# Patient Record
Sex: MALE | Race: WHITE | Employment: STUDENT | URBAN - METROPOLITAN AREA
[De-identification: names, ages, dates, MRNs, and addresses within clinical notes are randomized per-mention and may not be internally consistent; named-entity substitution may affect disease eponyms.]

---
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
## 2023-01-03 ENCOUNTER — LAB ENCOUNTER (OUTPATIENT)
Dept: LAB | Age: 18
End: 2023-01-03
Attending: FAMILY MEDICINE
Payer: COMMERCIAL

## 2023-01-03 DIAGNOSIS — N50.89 EPIDIDYMAL MASS: ICD-10-CM

## 2023-01-03 DIAGNOSIS — R10.31 GROIN PAIN, RIGHT: ICD-10-CM

## 2023-01-03 LAB
AFP-TM SERPL-MCNC: 1.3 NG/ML (ref ?–8)
BASOPHILS # BLD AUTO: 0.09 X10(3) UL (ref 0–0.2)
BASOPHILS NFR BLD AUTO: 1.2 %
BILIRUB UR QL STRIP.AUTO: NEGATIVE
CLARITY UR REFRACT.AUTO: CLEAR
COLOR UR AUTO: YELLOW
EOSINOPHIL # BLD AUTO: 0.26 X10(3) UL (ref 0–0.7)
EOSINOPHIL NFR BLD AUTO: 3.4 %
ERYTHROCYTE [DISTWIDTH] IN BLOOD BY AUTOMATED COUNT: 12.5 %
GLUCOSE UR STRIP.AUTO-MCNC: NEGATIVE MG/DL
HCT VFR BLD AUTO: 46.2 %
HGB BLD-MCNC: 15.4 G/DL
IMM GRANULOCYTES # BLD AUTO: 0.09 X10(3) UL (ref 0–1)
IMM GRANULOCYTES NFR BLD: 1.2 %
KETONES UR STRIP.AUTO-MCNC: NEGATIVE MG/DL
LEUKOCYTE ESTERASE UR QL STRIP.AUTO: NEGATIVE
LYMPHOCYTES # BLD AUTO: 2 X10(3) UL (ref 1.5–5)
LYMPHOCYTES NFR BLD AUTO: 26.3 %
MCH RBC QN AUTO: 31.5 PG (ref 26–34)
MCHC RBC AUTO-ENTMCNC: 33.3 G/DL (ref 31–37)
MCV RBC AUTO: 94.5 FL
MONOCYTES # BLD AUTO: 1.07 X10(3) UL (ref 0.1–1)
MONOCYTES NFR BLD AUTO: 14.1 %
NEUTROPHILS # BLD AUTO: 4.09 X10 (3) UL (ref 1.5–7.7)
NEUTROPHILS # BLD AUTO: 4.09 X10(3) UL (ref 1.5–7.7)
NEUTROPHILS NFR BLD AUTO: 53.8 %
NITRITE UR QL STRIP.AUTO: NEGATIVE
PH UR STRIP.AUTO: 7 [PH] (ref 5–8)
PLATELET # BLD AUTO: 264 10(3)UL (ref 150–450)
PROT UR STRIP.AUTO-MCNC: NEGATIVE MG/DL
RBC # BLD AUTO: 4.89 X10(6)UL
RBC UR QL AUTO: NEGATIVE
SP GR UR STRIP.AUTO: 1.02 (ref 1–1.03)
UROBILINOGEN UR STRIP.AUTO-MCNC: 0.2 MG/DL
WBC # BLD AUTO: 7.6 X10(3) UL (ref 4–11)

## 2023-01-03 PROCEDURE — 84402 ASSAY OF FREE TESTOSTERONE: CPT | Performed by: FAMILY MEDICINE

## 2023-01-03 PROCEDURE — 84403 ASSAY OF TOTAL TESTOSTERONE: CPT | Performed by: FAMILY MEDICINE

## 2023-01-03 PROCEDURE — 81003 URINALYSIS AUTO W/O SCOPE: CPT | Performed by: FAMILY MEDICINE

## 2023-01-03 PROCEDURE — 82105 ALPHA-FETOPROTEIN SERUM: CPT | Performed by: FAMILY MEDICINE

## 2023-01-03 PROCEDURE — 85025 COMPLETE CBC W/AUTO DIFF WBC: CPT | Performed by: FAMILY MEDICINE

## 2023-01-07 LAB
TESTOSTERONE TOTAL: 588.6 NG/DL
TESTOSTERONE, FREE -MS/MS: 85.7 PG/ML

## 2023-01-12 ENCOUNTER — TELEPHONE (OUTPATIENT)
Dept: FAMILY MEDICINE CLINIC | Facility: CLINIC | Age: 18
End: 2023-01-12

## 2023-01-12 DIAGNOSIS — N50.89 EPIDIDYMAL MASS: Primary | ICD-10-CM

## 2023-01-12 NOTE — TELEPHONE ENCOUNTER
FYI: Received a call from [facility] talked with Jessica Enriquez who states pt needs order for US scrotum to look at epididymal mass. Order placed. Jessica Enriquez verbalized understanding.    LOV: 12/28/22

## 2023-08-29 ENCOUNTER — HOSPITAL ENCOUNTER (EMERGENCY)
Age: 18
Discharge: HOME | End: 2023-08-29
Attending: EMERGENCY MEDICINE
Payer: COMMERCIAL

## 2023-08-29 ENCOUNTER — APPOINTMENT (OUTPATIENT)
Dept: GENERAL RADIOLOGY | Age: 18
End: 2023-08-29
Payer: COMMERCIAL

## 2023-08-29 VITALS
WEIGHT: 185 LBS | TEMPERATURE: 99 F | HEIGHT: 70 IN | OXYGEN SATURATION: 99 % | HEART RATE: 76 BPM | DIASTOLIC BLOOD PRESSURE: 77 MMHG | BODY MASS INDEX: 26.48 KG/M2 | SYSTOLIC BLOOD PRESSURE: 118 MMHG | RESPIRATION RATE: 16 BRPM

## 2023-08-29 DIAGNOSIS — S93.402A MODERATE LEFT ANKLE SPRAIN, INITIAL ENCOUNTER: Primary | ICD-10-CM

## 2023-08-29 PROCEDURE — 73610 X-RAY EXAM OF ANKLE: CPT

## 2023-08-29 PROCEDURE — 99284 EMERGENCY DEPT VISIT MOD MDM: CPT

## 2023-08-29 RX ORDER — IBUPROFEN 600 MG/1
600 TABLET ORAL ONCE
Status: COMPLETED | OUTPATIENT
Start: 2023-08-29 | End: 2023-08-29

## 2023-08-29 NOTE — ED INITIAL ASSESSMENT (HPI)
States while skateboarding he rolled his left ankle and is now unable to bear weight. States pain is worse on elevation.  No parasthesias ice in place

## 2023-08-29 NOTE — DISCHARGE INSTRUCTIONS
Ice, elevate, ibuprofen. Strip splint as needed. Crutches as needed. Weightbearing as tolerated. Return if new or worsening symptoms. Follow-up for further evaluation as discussed.

## 2023-11-11 ENCOUNTER — HOSPITAL ENCOUNTER (OUTPATIENT)
Age: 18
Discharge: HOME | End: 2023-11-11
Payer: COMMERCIAL

## 2023-11-11 ENCOUNTER — APPOINTMENT (OUTPATIENT)
Dept: ULTRASOUND IMAGING | Age: 18
End: 2023-11-11
Attending: NURSE PRACTITIONER
Payer: COMMERCIAL

## 2023-11-11 VITALS
RESPIRATION RATE: 20 BRPM | HEIGHT: 70 IN | BODY MASS INDEX: 27.2 KG/M2 | OXYGEN SATURATION: 98 % | TEMPERATURE: 98 F | DIASTOLIC BLOOD PRESSURE: 62 MMHG | WEIGHT: 190 LBS | SYSTOLIC BLOOD PRESSURE: 139 MMHG | HEART RATE: 86 BPM

## 2023-11-11 DIAGNOSIS — K92.0 HEMATEMESIS WITHOUT NAUSEA: ICD-10-CM

## 2023-11-11 DIAGNOSIS — R10.9 ABDOMINAL PAIN, ACUTE: Primary | ICD-10-CM

## 2023-11-11 LAB
#MXD IC: 0.8 X10ˆ3/UL (ref 0.1–1)
BUN BLD-MCNC: 15 MG/DL (ref 7–18)
CHLORIDE BLD-SCNC: 103 MMOL/L (ref 98–112)
CO2 BLD-SCNC: 25 MMOL/L (ref 21–32)
CREAT BLD-MCNC: 1.1 MG/DL
EGFRCR SERPLBLD CKD-EPI 2021: 99 ML/MIN/1.73M2 (ref 60–?)
GLUCOSE BLD-MCNC: 103 MG/DL (ref 70–99)
HCT VFR BLD AUTO: 46.5 %
HCT VFR BLD CALC: 47 %
HGB BLD-MCNC: 14.9 G/DL
ISTAT IONIZED CALCIUM FOR CHEM 8: 1.23 MMOL/L (ref 1.12–1.32)
LYMPHOCYTES # BLD AUTO: 2.2 X10ˆ3/UL (ref 1–4)
LYMPHOCYTES NFR BLD AUTO: 20.7 %
MCH RBC QN AUTO: 29.4 PG (ref 26–34)
MCHC RBC AUTO-ENTMCNC: 32 G/DL (ref 31–37)
MCV RBC AUTO: 91.9 FL (ref 80–100)
MIXED CELL %: 7.6 %
NEUTROPHILS # BLD AUTO: 7.6 X10ˆ3/UL (ref 1.5–7.7)
NEUTROPHILS NFR BLD AUTO: 71.7 %
PLATELET # BLD AUTO: 305 X10ˆ3/UL (ref 150–450)
POTASSIUM BLD-SCNC: 4.2 MMOL/L (ref 3.6–5.1)
RBC # BLD AUTO: 5.06 X10ˆ6/UL
SODIUM BLD-SCNC: 141 MMOL/L (ref 136–145)
WBC # BLD AUTO: 10.6 X10ˆ3/UL (ref 4–11)

## 2023-11-11 PROCEDURE — 99214 OFFICE O/P EST MOD 30 MIN: CPT

## 2023-11-11 PROCEDURE — 76700 US EXAM ABDOM COMPLETE: CPT | Performed by: NURSE PRACTITIONER

## 2023-11-11 PROCEDURE — 80047 BASIC METABLC PNL IONIZED CA: CPT

## 2023-11-11 PROCEDURE — 36415 COLL VENOUS BLD VENIPUNCTURE: CPT

## 2023-11-11 PROCEDURE — 85025 COMPLETE CBC W/AUTO DIFF WBC: CPT | Performed by: NURSE PRACTITIONER

## 2023-11-11 RX ORDER — PANTOPRAZOLE SODIUM 20 MG/1
20 TABLET, DELAYED RELEASE ORAL DAILY
Qty: 30 TABLET | Refills: 0 | Status: SHIPPED | OUTPATIENT
Start: 2023-11-11 | End: 2023-11-29

## 2023-11-11 RX ORDER — METHYLPHENIDATE HYDROCHLORIDE 20 MG/1
CAPSULE, EXTENDED RELEASE ORAL
COMMUNITY

## 2023-11-11 NOTE — DISCHARGE INSTRUCTIONS
Follow-up with GI, call Monday for an appointment.   Return for any dizziness, increased blood in vomit, diarrhea or any other concerns

## 2023-11-11 NOTE — ED INITIAL ASSESSMENT (HPI)
Abdominal pain x1-2 weeks. Reports vomiting \"chunks of blood\" three days ago and then today. Vomiting x1-2 years.

## 2023-11-29 ENCOUNTER — OFFICE VISIT (OUTPATIENT)
Dept: FAMILY MEDICINE CLINIC | Facility: CLINIC | Age: 18
End: 2023-11-29
Payer: COMMERCIAL

## 2023-11-29 ENCOUNTER — LAB ENCOUNTER (OUTPATIENT)
Dept: LAB | Age: 18
End: 2023-11-29
Attending: FAMILY MEDICINE
Payer: COMMERCIAL

## 2023-11-29 VITALS
TEMPERATURE: 98 F | WEIGHT: 214 LBS | SYSTOLIC BLOOD PRESSURE: 120 MMHG | HEIGHT: 70 IN | BODY MASS INDEX: 30.64 KG/M2 | OXYGEN SATURATION: 98 % | HEART RATE: 80 BPM | RESPIRATION RATE: 16 BRPM | DIASTOLIC BLOOD PRESSURE: 70 MMHG

## 2023-11-29 DIAGNOSIS — K92.0 HEMATEMESIS WITH NAUSEA: ICD-10-CM

## 2023-11-29 DIAGNOSIS — R63.5 WEIGHT GAIN, ABNORMAL: ICD-10-CM

## 2023-11-29 DIAGNOSIS — K92.0 HEMATEMESIS WITH NAUSEA: Primary | ICD-10-CM

## 2023-11-29 LAB
ALBUMIN SERPL-MCNC: 4.4 G/DL (ref 3.4–5)
ALP LIVER SERPL-CCNC: 77 U/L
ALT SERPL-CCNC: 30 U/L
AST SERPL-CCNC: 10 U/L (ref 15–37)
BILIRUB DIRECT SERPL-MCNC: 0.1 MG/DL (ref 0–0.2)
BILIRUB SERPL-MCNC: 0.4 MG/DL (ref 0.1–2)
ERYTHROCYTE [DISTWIDTH] IN BLOOD BY AUTOMATED COUNT: 12.7 %
HCT VFR BLD AUTO: 45 %
HGB BLD-MCNC: 14.9 G/DL
MCH RBC QN AUTO: 30.3 PG (ref 26–34)
MCHC RBC AUTO-ENTMCNC: 33.1 G/DL (ref 31–37)
MCV RBC AUTO: 91.5 FL
PLATELET # BLD AUTO: 307 10(3)UL (ref 150–450)
PROT SERPL-MCNC: 8 G/DL (ref 6.4–8.2)
RBC # BLD AUTO: 4.92 X10(6)UL
TSI SER-ACNC: 1.74 MIU/ML (ref 0.36–3.74)
WBC # BLD AUTO: 10 X10(3) UL (ref 4–11)

## 2023-11-29 PROCEDURE — 99214 OFFICE O/P EST MOD 30 MIN: CPT | Performed by: FAMILY MEDICINE

## 2023-11-29 PROCEDURE — 3008F BODY MASS INDEX DOCD: CPT | Performed by: FAMILY MEDICINE

## 2023-11-29 PROCEDURE — 80076 HEPATIC FUNCTION PANEL: CPT | Performed by: FAMILY MEDICINE

## 2023-11-29 PROCEDURE — 3078F DIAST BP <80 MM HG: CPT | Performed by: FAMILY MEDICINE

## 2023-11-29 PROCEDURE — 84443 ASSAY THYROID STIM HORMONE: CPT | Performed by: FAMILY MEDICINE

## 2023-11-29 PROCEDURE — 3074F SYST BP LT 130 MM HG: CPT | Performed by: FAMILY MEDICINE

## 2023-11-29 PROCEDURE — 83036 HEMOGLOBIN GLYCOSYLATED A1C: CPT | Performed by: FAMILY MEDICINE

## 2023-11-29 PROCEDURE — 85027 COMPLETE CBC AUTOMATED: CPT | Performed by: FAMILY MEDICINE

## 2023-11-29 RX ORDER — ONDANSETRON 4 MG/1
4 TABLET, FILM COATED ORAL EVERY 8 HOURS PRN
Qty: 30 TABLET | Refills: 0 | Status: SHIPPED | OUTPATIENT
Start: 2023-11-29

## 2023-11-29 RX ORDER — PANTOPRAZOLE SODIUM 40 MG/1
TABLET, DELAYED RELEASE ORAL
Qty: 120 TABLET | Refills: 0 | Status: SHIPPED | OUTPATIENT
Start: 2023-11-29 | End: 2024-02-27

## 2023-11-30 LAB
EST. AVERAGE GLUCOSE BLD GHB EST-MCNC: 105 MG/DL (ref 68–126)
HBA1C MFR BLD: 5.3 % (ref ?–5.7)

## 2023-12-19 ENCOUNTER — OFFICE VISIT (OUTPATIENT)
Dept: FAMILY MEDICINE CLINIC | Facility: CLINIC | Age: 18
End: 2023-12-19
Payer: COMMERCIAL

## 2023-12-19 VITALS
WEIGHT: 207 LBS | HEIGHT: 70 IN | SYSTOLIC BLOOD PRESSURE: 106 MMHG | HEART RATE: 95 BPM | DIASTOLIC BLOOD PRESSURE: 60 MMHG | RESPIRATION RATE: 18 BRPM | BODY MASS INDEX: 29.63 KG/M2 | OXYGEN SATURATION: 98 % | TEMPERATURE: 98 F

## 2023-12-19 DIAGNOSIS — L21.9 ACUTE SEBORRHEIC DERMATITIS: Primary | ICD-10-CM

## 2023-12-19 PROCEDURE — 99213 OFFICE O/P EST LOW 20 MIN: CPT | Performed by: FAMILY MEDICINE

## 2023-12-19 PROCEDURE — 3074F SYST BP LT 130 MM HG: CPT | Performed by: FAMILY MEDICINE

## 2023-12-19 PROCEDURE — 3078F DIAST BP <80 MM HG: CPT | Performed by: FAMILY MEDICINE

## 2023-12-19 PROCEDURE — 3008F BODY MASS INDEX DOCD: CPT | Performed by: FAMILY MEDICINE

## 2023-12-19 RX ORDER — KETOCONAZOLE 20 MG/ML
10 SHAMPOO TOPICAL
Qty: 100 ML | Refills: 0 | Status: SHIPPED | OUTPATIENT
Start: 2023-12-21 | End: 2024-01-25

## 2023-12-19 RX ORDER — DEXTROAMPHETAMINE SACCHARATE, AMPHETAMINE ASPARTATE MONOHYDRATE, DEXTROAMPHETAMINE SULFATE AND AMPHETAMINE SULFATE 5; 5; 5; 5 MG/1; MG/1; MG/1; MG/1
CAPSULE, EXTENDED RELEASE ORAL
COMMUNITY
Start: 2023-12-14

## 2023-12-19 RX ORDER — KETOCONAZOLE 2 G/100G
1 AEROSOL, FOAM TOPICAL 2 TIMES DAILY
Qty: 100 G | Refills: 1 | Status: SHIPPED | OUTPATIENT
Start: 2023-12-19 | End: 2023-12-19

## 2023-12-19 NOTE — PATIENT INSTRUCTIONS
Use one of the following shampoos to help keep scalp clean and free of dandruff and fungus that causes dandruff     - Selsun Blue (selenium sulfide)  - Head & Shoulders (zinc pyrithione)  - Neutragena T-Gel  (coal tar)

## 2024-02-04 ENCOUNTER — HOSPITAL ENCOUNTER (EMERGENCY)
Age: 19
Discharge: HOME | End: 2024-02-04
Attending: EMERGENCY MEDICINE
Payer: COMMERCIAL

## 2024-02-04 ENCOUNTER — APPOINTMENT (OUTPATIENT)
Dept: GENERAL RADIOLOGY | Age: 19
End: 2024-02-04
Attending: NURSE PRACTITIONER
Payer: COMMERCIAL

## 2024-02-04 VITALS
WEIGHT: 180 LBS | TEMPERATURE: 98 F | HEART RATE: 90 BPM | OXYGEN SATURATION: 99 % | RESPIRATION RATE: 20 BRPM | HEIGHT: 70 IN | BODY MASS INDEX: 25.77 KG/M2 | SYSTOLIC BLOOD PRESSURE: 133 MMHG | DIASTOLIC BLOOD PRESSURE: 81 MMHG

## 2024-02-04 DIAGNOSIS — R10.11 RUQ PAIN: ICD-10-CM

## 2024-02-04 DIAGNOSIS — R07.9 CHEST PAIN OF UNCERTAIN ETIOLOGY: Primary | ICD-10-CM

## 2024-02-04 LAB
ALBUMIN SERPL-MCNC: 4.1 G/DL (ref 3.4–5)
ALBUMIN/GLOB SERPL: 1.2 {RATIO} (ref 1–2)
ALP LIVER SERPL-CCNC: 65 U/L
ALT SERPL-CCNC: 36 U/L
ANION GAP SERPL CALC-SCNC: 2 MMOL/L (ref 0–18)
AST SERPL-CCNC: 14 U/L (ref 15–37)
BASOPHILS # BLD AUTO: 0.08 X10(3) UL (ref 0–0.2)
BASOPHILS NFR BLD AUTO: 0.5 %
BILIRUB SERPL-MCNC: 0.3 MG/DL (ref 0.1–2)
BUN BLD-MCNC: 16 MG/DL (ref 9–23)
CALCIUM BLD-MCNC: 9.6 MG/DL (ref 8.5–10.1)
CHLORIDE SERPL-SCNC: 110 MMOL/L (ref 98–112)
CO2 SERPL-SCNC: 30 MMOL/L (ref 21–32)
CREAT BLD-MCNC: 1.28 MG/DL
D DIMER PPP FEU-MCNC: <0.27 UG/ML FEU (ref ?–0.5)
EGFRCR SERPLBLD CKD-EPI 2021: 82 ML/MIN/1.73M2 (ref 60–?)
EOSINOPHIL # BLD AUTO: 0.07 X10(3) UL (ref 0–0.7)
EOSINOPHIL NFR BLD AUTO: 0.4 %
ERYTHROCYTE [DISTWIDTH] IN BLOOD BY AUTOMATED COUNT: 12.7 %
GLOBULIN PLAS-MCNC: 3.4 G/DL (ref 2.8–4.4)
GLUCOSE BLD-MCNC: 66 MG/DL (ref 70–99)
GLUCOSE BLD-MCNC: 93 MG/DL (ref 70–99)
HCT VFR BLD AUTO: 42.4 %
HGB BLD-MCNC: 14.4 G/DL
IMM GRANULOCYTES # BLD AUTO: 0.42 X10(3) UL (ref 0–1)
IMM GRANULOCYTES NFR BLD: 2.6 %
LIPASE SERPL-CCNC: 34 U/L (ref 13–75)
LYMPHOCYTES # BLD AUTO: 2.68 X10(3) UL (ref 1–4)
LYMPHOCYTES NFR BLD AUTO: 16.7 %
MCH RBC QN AUTO: 30.2 PG (ref 26–34)
MCHC RBC AUTO-ENTMCNC: 34 G/DL (ref 31–37)
MCV RBC AUTO: 88.9 FL
MONOCYTES # BLD AUTO: 0.95 X10(3) UL (ref 0.1–1)
MONOCYTES NFR BLD AUTO: 5.9 %
NEUTROPHILS # BLD AUTO: 11.81 X10 (3) UL (ref 1.5–7.7)
NEUTROPHILS # BLD AUTO: 11.81 X10(3) UL (ref 1.5–7.7)
NEUTROPHILS NFR BLD AUTO: 73.9 %
OSMOLALITY SERPL CALC.SUM OF ELEC: 293 MOSM/KG (ref 275–295)
PLATELET # BLD AUTO: 380 10(3)UL (ref 150–450)
POTASSIUM SERPL-SCNC: 4.1 MMOL/L (ref 3.5–5.1)
PROT SERPL-MCNC: 7.5 G/DL (ref 6.4–8.2)
RBC # BLD AUTO: 4.77 X10(6)UL
SODIUM SERPL-SCNC: 142 MMOL/L (ref 136–145)
TROPONIN I SERPL HS-MCNC: 3 NG/L
WBC # BLD AUTO: 16 X10(3) UL (ref 4–11)

## 2024-02-04 PROCEDURE — 36415 COLL VENOUS BLD VENIPUNCTURE: CPT

## 2024-02-04 PROCEDURE — 93005 ELECTROCARDIOGRAM TRACING: CPT

## 2024-02-04 PROCEDURE — 84484 ASSAY OF TROPONIN QUANT: CPT | Performed by: NURSE PRACTITIONER

## 2024-02-04 PROCEDURE — 99285 EMERGENCY DEPT VISIT HI MDM: CPT

## 2024-02-04 PROCEDURE — 93010 ELECTROCARDIOGRAM REPORT: CPT

## 2024-02-04 PROCEDURE — 99284 EMERGENCY DEPT VISIT MOD MDM: CPT

## 2024-02-04 PROCEDURE — 71046 X-RAY EXAM CHEST 2 VIEWS: CPT | Performed by: NURSE PRACTITIONER

## 2024-02-04 PROCEDURE — 85379 FIBRIN DEGRADATION QUANT: CPT | Performed by: NURSE PRACTITIONER

## 2024-02-04 PROCEDURE — 80053 COMPREHEN METABOLIC PANEL: CPT | Performed by: NURSE PRACTITIONER

## 2024-02-04 PROCEDURE — 85025 COMPLETE CBC W/AUTO DIFF WBC: CPT | Performed by: NURSE PRACTITIONER

## 2024-02-04 PROCEDURE — 83690 ASSAY OF LIPASE: CPT | Performed by: NURSE PRACTITIONER

## 2024-02-04 PROCEDURE — 82962 GLUCOSE BLOOD TEST: CPT

## 2024-02-04 RX ORDER — FLUVOXAMINE MALEATE 50 MG/1
50 TABLET, COATED ORAL EVERY MORNING
COMMUNITY
Start: 2024-01-12

## 2024-02-05 LAB
ATRIAL RATE: 102 BPM
P AXIS: 55 DEGREES
P-R INTERVAL: 158 MS
Q-T INTERVAL: 328 MS
QRS DURATION: 84 MS
QTC CALCULATION (BEZET): 427 MS
R AXIS: 70 DEGREES
T AXIS: 66 DEGREES
VENTRICULAR RATE: 102 BPM

## 2024-02-05 NOTE — ED PROVIDER NOTES
Patient Seen in: [facility] In [place]      History     Chief Complaint   Patient presents with    Chest Pain Angina     Stated Complaint: L sided chest pain upon excertion for a couple weeks    Subjective:   A 18-year-old male, who presents with left-sided chest discomfort and shortness of breath.  States he first noticed it 1 week ago while laying in bed and according to him was doing nothing.  States he felt an itching sensation to the left side of his chest.  I did ask him, if it felt palpitations.  He then told me that it is not palpitations, but that the itchiness sensation like when your skin is itchy.  States every now and then he still has that sensation.  However over the last week he has also had chest pain with shortness of breath on exertion.  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  States he is experiencing this pain while working as well.  He does not know if this is anxiety.  States that he does not seem any more anxious than normal when it happens.  He denies history of blood clots.  Does not use any hormone medications.  No recent injury, trauma, surgery.  Denies one-sided leg swelling or calf pain.            Objective:   Past Medical History:   Diagnosis Date    Abdominal pain     Allergic rhinitis     Anxiety     Attention deficit hyperactivity disorder (ADHD)     Depression     Flatulence/gas pain/belching     History of depression     IBS (irritable bowel syndrome)     Nausea     Obesity     Visual impairment     glasses    Vomiting     Wears glasses               Past Surgical History:   Procedure Laterality Date    CHOLECYSTECTOMY      REMOVAL GALLBLADDER      REMOVAL GALLBLADDER      2021                Social History     Socioeconomic History    Marital status: Single   Tobacco Use    Smoking status: Never     Passive exposure: Never    Smokeless tobacco: Never    Tobacco comments:     Used to vape for about 2 years, have since quit   Vaping Use    Vaping  Use: Never used   Substance and Sexual Activity    Alcohol use: No    Drug use: Yes     Types: Cannabis     Comment: last use 2 weekss   Other Topics Concern    Caffeine Concern No    Exercise No    Seat Belt No    Special Diet No    Stress Concern No    Weight Concern No              Review of Systems   Constitutional: Negative.    HENT: Negative.     Respiratory:  Positive for shortness of breath.    Cardiovascular:  Positive for chest pain.   Skin:  Negative for rash and wound.   All other systems reviewed and are negative.      Positive for stated complaint: L sided chest pain upon excertion for a couple weeks  Other systems are as noted in HPI.  Constitutional and vital signs reviewed.      All other systems reviewed and negative except as noted above.    Physical Exam     ED Triage Vitals [02/04/24 1907]   /79   Pulse 98   Resp 18   Temp 98 °F (36.7 °C)   Temp src Temporal   SpO2 98 %   O2 Device None (Room air)       Current:/68   Pulse 88   Temp 98 °F (36.7 °C) (Temporal)   Resp 18   Ht 177.8 cm (5' 10\")   Wt 81.6 kg   SpO2 99%   BMI 25.83 kg/m²         Physical Exam  Vitals and nursing note reviewed.   Constitutional:       General: He is not in acute distress.     Appearance: He is well-developed. He is not ill-appearing, toxic-appearing or diaphoretic.   Cardiovascular:      Rate and Rhythm: Normal rate and regular rhythm.      Heart sounds: Normal heart sounds.   Pulmonary:      Effort: Pulmonary effort is normal. No tachypnea or respiratory distress.      Breath sounds: Normal breath sounds.   Abdominal:      Palpations: Abdomen is soft.      Tenderness: There is abdominal tenderness in the right upper quadrant. There is no guarding or rebound.   Musculoskeletal:      Cervical back: Normal range of motion and neck supple.      Right lower leg: No edema.      Left lower leg: No edema.   Skin:     General: Skin is dry.      Coloration: Skin is not pale.      Findings: No ecchymosis or  rash.   Neurological:      General: No focal deficit present.      Mental Status: He is alert.   Psychiatric:         Mood and Affect: Mood normal.               ED Course     Labs Reviewed   COMP METABOLIC PANEL (14) - Abnormal; Notable for the following components:       Result Value    Glucose 66 (*)     AST 14 (*)     All other components within normal limits   CBC W/ DIFFERENTIAL - Abnormal; Notable for the following components:    WBC 16.0 (*)     Neutrophil Absolute Prelim 11.81 (*)     Neutrophil Absolute 11.81 (*)     All other components within normal limits   TROPONIN I HIGH SENSITIVITY - Normal   D-DIMER - Normal   LIPASE - Normal   CBC WITH DIFFERENTIAL WITH PLATELET    Narrative:     The following orders were created for panel order CBC With Differential With Platelet.  Procedure                               Abnormality         Status                     ---------                               -----------         ------                     CBC W/ DIFFERENTIAL[[id number]]          Abnormal            Final result                 Please view results for these tests on the individual orders.     EKG    Rate, intervals and axes as noted on EKG Report.  Rate: 102  Rhythm: Sinus Rhythm  Reading: Sinus tachycardia              ED Course as of 02/04/24 2123  ------------------------------------------------------------  Time: 02/04 2037  Comment: Denies sore throat.  States he did have a stomach bug last week which has resolved.  He did have his gallbladder taken out when he was 16 years of age.  ------------------------------------------------------------  Time: 02/04 2040  Comment: Patient was given 8 ounces of apple juice.     XR CHEST PA + LAT CHEST (CPT=71046)    Result Date: 2/4/2024  PROCEDURE:  XR CHEST PA + LAT CHEST (CPT=71046)  INDICATIONS:  L sided chest pain upon excertion for a couple weeks  COMPARISON:  [facility], XR, XR CHEST AP PORTABLE  (CPT=71045), 3/25/2022, 4:14 PM.  TECHNIQUE:  PA and  lateral chest radiographs were obtained.  PATIENT STATED HISTORY: (As transcribed by Technologist)  Pt c/o left sided anterior chest pain and feels pain when lifting his arm for 1 week. Pt feels that he cant take a full inspiration.    FINDINGS:  LUNGS:  No focal consolidation.  Normal vascularity. CARDIAC:  Normal size cardiac silhouette. MEDIASTINUM:  Normal. PLEURA:  Normal.  No pleural effusions. BONES:  Normal for age.            CONCLUSION:  No acute process   LOCATION:  [facility]   Dictated by (CST): Stuart Mcgarry MD on 2/04/2024 at 8:31 PM     Finalized by (CST): Stuart Mcgarry MD on 2/04/2024 at 8:33 PM               MDM                                         Medical Decision Making  Differential diagnosis considered but not limited to: Acute coronary syndrome, pulmonary embolism, noncardiac chest pain    A 18-year-old male, who presents with left-sided chest discomfort and shortness of breath.  States he first noticed it 1 week ago while laying in bed and according to him was doing nothing.  States he felt an itching sensation to the left side of his chest.  I did ask him, if it felt palpitations.  He then told me that it is not palpitations, but that the itchiness sensation like when your skin is itchy.  States every now and then he still has that sensation.  However over the last week he has also had chest pain with shortness of breath on exertion.  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  States he is experiencing this pain while working as well.  He does not know if this is anxiety.  States that he does not seem any more anxious than normal when it happens.  He denies history of blood clots.  Does not use any hormone medications.  No recent injury, trauma, surgery.  Denies one-sided leg swelling or calf pain.  Workup included a chest x-ray, EKG, CBC, CMP, lipase, troponin, D-dimer.  X-ray shows no acute disease.  EKG is a normal sinus rhythm.  Negative D-dimer.  Negative troponin.  Not likely  to be acute coronary syndrome, pulm embolism, pneumonia.  He did have some upper quadrant abdominal tenderness on palpation without guarding or rebound.  Did have a cholecystectomy a couple years ago.  States he does not drink alcohol heavily.  Liver enzymes are not elevated.  WBC of 16, however no recent illness.  No fevers or other signs of systemic symptoms.  Unlikely to be an acute or surgical abdomen, given the normal CMP and lipase.  Did recommend to return if fevers or worsening symptoms.  We did recheck her blood glucose which was 93.    Supportive/home management of diagnosis/illness/injury discussed. Red flag symptoms discussed.  Signs and symptoms/criteria that would necessitate reevaluation, including ER evaluation discussed.  Patient and/or responsible adult verbalize and agree with management and plan of care.    Speech recognition software was used during this dictation.  There may be minor errors in transcription.        Amount and/or Complexity of Data Reviewed  Labs: ordered. Decision-making details documented in ED Course.  Radiology: ordered and independent interpretation performed. Decision-making details documented in ED Course.        Disposition and Plan     Clinical Impression:  1. Chest pain of uncertain etiology    2. RUQ pain         Disposition:  Discharge  2/4/2024  9:23 pm    Follow-up:  Patt Mcdonnell MD  [id number]  [facility] [address]  [phone]    Schedule an appointment as soon as possible for a visit in 1 day(s)      [facility] in [place]  [address] [place] [id number]  [phone]  Go to  If symptoms worsen          Medications Prescribed:  Current Discharge Medication List

## 2024-02-05 NOTE — DISCHARGE INSTRUCTIONS
Stay well-hydrated and well-nourished.  Call your doctor tomorrow to schedule a follow-up appointment.  Return for new or worsening symptoms, including fevers, or new or worsening symptoms.

## 2024-02-20 ENCOUNTER — LAB ENCOUNTER (OUTPATIENT)
Dept: LAB | Age: 19
End: 2024-02-20
Attending: FAMILY MEDICINE
Payer: COMMERCIAL

## 2024-02-20 ENCOUNTER — OFFICE VISIT (OUTPATIENT)
Dept: FAMILY MEDICINE CLINIC | Facility: CLINIC | Age: 19
End: 2024-02-20
Payer: COMMERCIAL

## 2024-02-20 VITALS
BODY MASS INDEX: 27.35 KG/M2 | HEART RATE: 90 BPM | HEIGHT: 70 IN | OXYGEN SATURATION: 98 % | TEMPERATURE: 98 F | DIASTOLIC BLOOD PRESSURE: 60 MMHG | RESPIRATION RATE: 20 BRPM | WEIGHT: 191 LBS | SYSTOLIC BLOOD PRESSURE: 120 MMHG

## 2024-02-20 DIAGNOSIS — E16.2 HYPOGLYCEMIA: ICD-10-CM

## 2024-02-20 DIAGNOSIS — R07.89 CHEST TIGHTNESS: Primary | ICD-10-CM

## 2024-02-20 DIAGNOSIS — L29.9 ITCHING: ICD-10-CM

## 2024-02-20 DIAGNOSIS — R07.89 CHEST TIGHTNESS: ICD-10-CM

## 2024-02-20 LAB
ANION GAP SERPL CALC-SCNC: 9 MMOL/L (ref 0–18)
BASOPHILS # BLD AUTO: 0.06 X10(3) UL (ref 0–0.2)
BASOPHILS NFR BLD AUTO: 0.5 %
BUN BLD-MCNC: 13 MG/DL (ref 9–23)
CALCIUM BLD-MCNC: 10.2 MG/DL (ref 8.5–10.1)
CHLORIDE SERPL-SCNC: 107 MMOL/L (ref 98–112)
CO2 SERPL-SCNC: 24 MMOL/L (ref 21–32)
CREAT BLD-MCNC: 1.17 MG/DL
EGFRCR SERPLBLD CKD-EPI 2021: 92 ML/MIN/1.73M2 (ref 60–?)
EOSINOPHIL # BLD AUTO: 0.03 X10(3) UL (ref 0–0.7)
EOSINOPHIL NFR BLD AUTO: 0.3 %
ERYTHROCYTE [DISTWIDTH] IN BLOOD BY AUTOMATED COUNT: 12.6 %
FASTING STATUS PATIENT QL REPORTED: NO
GLUCOSE BLD-MCNC: 97 MG/DL (ref 70–99)
HCT VFR BLD AUTO: 43.5 %
HGB BLD-MCNC: 15.1 G/DL
IMM GRANULOCYTES # BLD AUTO: 0.07 X10(3) UL (ref 0–1)
IMM GRANULOCYTES NFR BLD: 0.6 %
INSULIN SERPL-ACNC: 8.6 MU/L (ref 3–25)
LYMPHOCYTES # BLD AUTO: 2.39 X10(3) UL (ref 1–4)
LYMPHOCYTES NFR BLD AUTO: 21.6 %
MCH RBC QN AUTO: 30.8 PG (ref 26–34)
MCHC RBC AUTO-ENTMCNC: 34.7 G/DL (ref 31–37)
MCV RBC AUTO: 88.8 FL
MONOCYTES # BLD AUTO: 0.97 X10(3) UL (ref 0.1–1)
MONOCYTES NFR BLD AUTO: 8.8 %
NEUTROPHILS # BLD AUTO: 7.55 X10 (3) UL (ref 1.5–7.7)
NEUTROPHILS # BLD AUTO: 7.55 X10(3) UL (ref 1.5–7.7)
NEUTROPHILS NFR BLD AUTO: 68.2 %
OSMOLALITY SERPL CALC.SUM OF ELEC: 290 MOSM/KG (ref 275–295)
PLATELET # BLD AUTO: 332 10(3)UL (ref 150–450)
POTASSIUM SERPL-SCNC: 3.7 MMOL/L (ref 3.5–5.1)
RBC # BLD AUTO: 4.9 X10(6)UL
SODIUM SERPL-SCNC: 140 MMOL/L (ref 136–145)
URATE SERPL-MCNC: 4 MG/DL
WBC # BLD AUTO: 11.1 X10(3) UL (ref 4–11)

## 2024-02-20 PROCEDURE — 85025 COMPLETE CBC W/AUTO DIFF WBC: CPT

## 2024-02-20 PROCEDURE — 80048 BASIC METABOLIC PNL TOTAL CA: CPT

## 2024-02-20 PROCEDURE — 83036 HEMOGLOBIN GLYCOSYLATED A1C: CPT

## 2024-02-20 PROCEDURE — 3008F BODY MASS INDEX DOCD: CPT | Performed by: FAMILY MEDICINE

## 2024-02-20 PROCEDURE — 84550 ASSAY OF BLOOD/URIC ACID: CPT

## 2024-02-20 PROCEDURE — 3078F DIAST BP <80 MM HG: CPT | Performed by: FAMILY MEDICINE

## 2024-02-20 PROCEDURE — 99213 OFFICE O/P EST LOW 20 MIN: CPT | Performed by: FAMILY MEDICINE

## 2024-02-20 PROCEDURE — 3074F SYST BP LT 130 MM HG: CPT | Performed by: FAMILY MEDICINE

## 2024-02-20 PROCEDURE — 83525 ASSAY OF INSULIN: CPT

## 2024-02-20 NOTE — PATIENT INSTRUCTIONS
If symptoms do not improve, you may need an endoscopy (EGD) to look for reflux back up into esophagus     Continue pantoprazole every other day for now

## 2024-02-20 NOTE — PROGRESS NOTES
Subjective:   Slava Gates is a 18 year old male who presents for ER F/U (ED in [place] 2/4/24, chest pain, pt states he feels the chest pain when he is stress or with certain movements)     Itching in chest   Chest pain - squeezing sensation/tightness, comes/goes   Ithcing occurs with stress, but not always present when he is stressed, but states it was not the same as palpitations   Went to ER - work up essentially negative   H/o vomiting, was started on PPI - now weaned down as he is not using as much THC       Low blood sugar   Had eaten about 3-4 hours prior - with fruit, protein, carbs   Polyuria: yes, but drinking more water       H/o anxiety   Physical sx cause anxiety   Tries biofeedback           History/Other:    Chief Complaint Reviewed and Verified  Nursing Notes Reviewed and   Verified  Tobacco Reviewed  Allergies Reviewed  Medications Reviewed    Problem List Reviewed  Medical History Reviewed  Surgical History   Reviewed  Family History Reviewed  Social History Reviewed         Tobacco:  He has never smoked tobacco.    Current Outpatient Medications   Medication Sig Dispense Refill    risperiDONE 0.5 MG Oral Tab Take 2 tablets (1 mg total) by mouth nightly.      hydrOXYzine 50 MG Oral Tab One tablet in am and one tablet daily as needed for anxiety 60 tablet 0    fluvoxaMINE 50 MG Oral Tab Take 1 tablet (50 mg total) by mouth 2 (two) times daily. (Patient taking differently: Take 1 tablet (50 mg total) by mouth nightly.) 60 tablet 0    busPIRone 15 MG Oral Tab Take 1 tablet (15 mg total) by mouth daily. 30 tablet 0    cholecalciferol 125 MCG (5000 UT) Oral Tab Take 1 tablet (5,000 Units total) by mouth daily. 30 tablet 0    cyanocobalamin 100 MCG Oral Tab Take 1 tablet (100 mcg total) by mouth daily. 30 tablet 0    traZODone 50 MG Oral Tab Take 1 tablet (50 mg total) by mouth nightly as needed for Sleep (sleep/insomnia). 30 tablet 0         Review of Systems:  Review of Systems    Constitutional: Negative.    HENT: Negative.     Eyes: Negative.    Respiratory:  Negative for cough, shortness of breath and wheezing.    Cardiovascular:  Negative for palpitations and leg swelling.   Gastrointestinal:  Negative for abdominal distention, diarrhea and vomiting.   Endocrine: Negative for polydipsia, polyphagia and polyuria.   Genitourinary:  Negative for difficulty urinating, dysuria, frequency and hematuria.   Musculoskeletal:  Negative for arthralgias, back pain and myalgias.       Objective:   /60   Pulse 90   Temp 98 °F (36.7 °C)   Resp 20   Ht 5' 10\" (1.778 m)   Wt 191 lb (86.6 kg)   SpO2 98%   BMI 27.41 kg/m²  Estimated body mass index is 27.41 kg/m² as calculated from the following:    Height as of this encounter: 5' 10\" (1.778 m).    Weight as of this encounter: 191 lb (86.6 kg).  Physical Exam  Constitutional:       General: He is not in acute distress.     Appearance: He is well-developed.   Cardiovascular:      Rate and Rhythm: Normal rate and regular rhythm.   Pulmonary:      Effort: Pulmonary effort is normal.      Breath sounds: Normal breath sounds.   Skin:     Coloration: Skin is not jaundiced or pale.   Neurological:      General: No focal deficit present.      Mental Status: He is alert and oriented to person, place, and time.   Psychiatric:         Mood and Affect: Mood normal.         Behavior: Behavior normal.         Assessment & Plan:   1. Chest tightness (Primary)  -     Uric Acid; Future; Expected date: 02/20/2024  -     CBC With Differential With Platelet; Future; Expected date: 02/20/2024  2. Hypoglycemia  -     Basic Metabolic Panel (8); Future; Expected date: 02/20/2024  -     Hemoglobin A1C; Future; Expected date: 02/20/2024  -     Insulin; Future; Expected date: 02/20/2024  3. Itching  -     Uric Acid; Future; Expected date: 02/20/2024  -     CBC With Differential With Platelet; Future; Expected date: 02/20/2024      May be reflux/anxiety related   Cont  PPI - if sx do now improve then see GI for possible EGD       Return in about 3 months (around 5/20/2024), or if symptoms worsen or fail to improve.    Due for physical     KELSI ORONA MD, 2/20/2024, 1:49 PM

## 2024-02-21 LAB
EST. AVERAGE GLUCOSE BLD GHB EST-MCNC: 100 MG/DL (ref 68–126)
HBA1C MFR BLD: 5.1 % (ref ?–5.7)

## 2024-05-28 ENCOUNTER — OFFICE VISIT (OUTPATIENT)
Dept: FAMILY MEDICINE CLINIC | Facility: CLINIC | Age: 19
End: 2024-05-28

## 2024-05-28 VITALS
SYSTOLIC BLOOD PRESSURE: 110 MMHG | WEIGHT: 199 LBS | HEART RATE: 84 BPM | OXYGEN SATURATION: 98 % | RESPIRATION RATE: 16 BRPM | DIASTOLIC BLOOD PRESSURE: 80 MMHG | HEIGHT: 70 IN | BODY MASS INDEX: 28.49 KG/M2

## 2024-05-28 DIAGNOSIS — B07.0 PLANTAR WART, LEFT FOOT: Primary | ICD-10-CM

## 2024-05-28 PROCEDURE — 3079F DIAST BP 80-89 MM HG: CPT | Performed by: NURSE PRACTITIONER

## 2024-05-28 PROCEDURE — 3074F SYST BP LT 130 MM HG: CPT | Performed by: NURSE PRACTITIONER

## 2024-05-28 PROCEDURE — 3008F BODY MASS INDEX DOCD: CPT | Performed by: NURSE PRACTITIONER

## 2024-05-28 PROCEDURE — 99214 OFFICE O/P EST MOD 30 MIN: CPT | Performed by: NURSE PRACTITIONER

## 2024-05-28 NOTE — PROGRESS NOTES
Chief Complaint   Patient presents with    Callus     Left foot heel callus         HPI: Pt has L  heel pain. Pt had for  2-3 months , one day ago open split,getting worse, sharp pain, worse with bearing weight and better with rest.  Rates pain at 6/10       Current Outpatient Medications   Medication Sig Dispense Refill    VITAMIN D3 125 MCG (5000 UT) Oral Cap Take by mouth every morning. Authorized by: EARL ROBERTS      risperiDONE 0.5 MG Oral Tab Take 2 tablets (1 mg total) by mouth nightly.      hydrOXYzine 50 MG Oral Tab One tablet in am and one tablet daily as needed for anxiety 60 tablet 0    fluvoxaMINE 50 MG Oral Tab Take 1 tablet (50 mg total) by mouth 2 (two) times daily. (Patient taking differently: Take 1 tablet (50 mg total) by mouth nightly.) 60 tablet 0    busPIRone 15 MG Oral Tab Take 1 tablet (15 mg total) by mouth daily. 30 tablet 0    cyanocobalamin 100 MCG Oral Tab Take 1 tablet (100 mcg total) by mouth daily. 30 tablet 0    traZODone 50 MG Oral Tab Take 1 tablet (50 mg total) by mouth nightly as needed for Sleep (sleep/insomnia). 30 tablet 0      Past Medical History:    Abdominal pain    Allergic rhinitis    Anxiety    Attention deficit hyperactivity disorder (ADHD)    Depression    Flatulence/gas pain/belching    History of depression    IBS (irritable bowel syndrome)    Nausea    Obesity    Visual impairment    glasses    Vomiting    Wears glasses      Past Surgical History:   Procedure Laterality Date    Cholecystectomy      Removal gallbladder      Removal gallbladder      2021      Family History   Problem Relation Age of Onset    Depression Mother     Anxiety Mother     Ulcerative Colitis Mother     Ovarian Cancer Maternal Grandmother     Hypertension Maternal Grandmother     Prostate Cancer Maternal Grandfather     Cancer Maternal Grandfather         Prostate cancer    Bipolar Disorder Sister         mood disorder    ADHD Sister     Depression Other         mat aunt    Anxiety  Other         mat aunt    Alcohol and Other Disorders Associated Other         pat grt gf, pat grt uncle, pat aunt, pat 2nd cousin    Substance Abuse Other         pat 2nd cousin      Social History:  Social History     Socioeconomic History    Marital status: Single   Tobacco Use    Smoking status: Never     Passive exposure: Never    Smokeless tobacco: Never    Tobacco comments:     Used to vape for about 2 years, have since quit   Vaping Use    Vaping status: Never Used   Substance and Sexual Activity    Alcohol use: No    Drug use: Yes     Types: Cannabis     Comment: last use 2/8/2024- agrees to abstain   Other Topics Concern    Caffeine Concern No    Exercise No    Seat Belt No    Special Diet No    Stress Concern No    Weight Concern No     Social Determinants of Health      [facility]           ROS:  GEN: no fever, no chills, no fatigue  CHEST: no chest pains.  SKIN: no rashes  HEM: no ecchymoses  JOINTS: no other joints pain.  NEURO: no tingling, no weakness, no abnormal sensation.      /80   Pulse 84   Resp 16   Ht 5' 10\" (1.778 m)   Wt 199 lb (90.3 kg)   SpO2 98%   BMI 28.55 kg/m²     PE:  Gen:  WD/WN NAD  HEENT:  PEERLA, EOM-i.  LUNGS:CTA tish.  HEART:S1/S2 reg., no murmurs, clicks, gallops  SKIN:no rashes on the chest or back.  Back:  Normal on inspection, no pain on palpation of the spinal and paraspinal muscles.   L foot: Pain on palpation, plantar wart Left heel       A/P  Diagnoses and all orders for this visit:    Plantar wart, left foot  -     Podiatry Referral - In Network     Stable   Follow up with podiatry

## 2024-06-14 ENCOUNTER — OFFICE VISIT (OUTPATIENT)
Facility: LOCATION | Age: 19
End: 2024-06-14
Payer: COMMERCIAL

## 2024-06-14 DIAGNOSIS — B07.0 PLANTAR WART OF LEFT FOOT: Primary | ICD-10-CM

## 2024-06-14 DIAGNOSIS — M79.672 LEFT FOOT PAIN: ICD-10-CM

## 2024-06-14 PROCEDURE — 99203 OFFICE O/P NEW LOW 30 MIN: CPT | Performed by: PODIATRIST

## 2024-06-14 NOTE — PROGRESS NOTES
[facility]  Progress Note    Slava Gates is a 18 year old male.   Chief Complaint   Patient presents with    Warts     Consult left foot plantar wart no pain at this time. a month ago was open, pain full and sensitive.         HPI:     Patient is a pleasant 18-year-old male who is presenting to clinic today with complaints of a plantars wart to his left heel.  Patient states that he has noticed that his work to the bottom of his left heel for about a month.  He states that it was open and painful/sensitive.  He did go to his PCP, who referred him over to me.  He states that it is no longer painful and is denying any recent drainage or signs of infection.  Patient has not tried any treatment options for this yet.  He is hoping to discuss options today.  No other concerns and here for further evaluation and care.  Denies recent nausea, vomiting, fevers, chills.    Allergies: Perfumes   Current Outpatient Medications   Medication Sig Dispense Refill    VITAMIN D3 125 MCG (5000 UT) Oral Cap Take by mouth every morning. Authorized by: EARL ROBERTS      risperiDONE 0.5 MG Oral Tab Take 2 tablets (1 mg total) by mouth nightly.      hydrOXYzine 50 MG Oral Tab One tablet in am and one tablet daily as needed for anxiety 60 tablet 0    fluvoxaMINE 50 MG Oral Tab Take 1 tablet (50 mg total) by mouth 2 (two) times daily. (Patient taking differently: Take 1 tablet (50 mg total) by mouth nightly.) 60 tablet 0    busPIRone 15 MG Oral Tab Take 1 tablet (15 mg total) by mouth daily. 30 tablet 0    cyanocobalamin 100 MCG Oral Tab Take 1 tablet (100 mcg total) by mouth daily. 30 tablet 0    traZODone 50 MG Oral Tab Take 1 tablet (50 mg total) by mouth nightly as needed for Sleep (sleep/insomnia). 30 tablet 0      Past Medical History:    Abdominal pain    Allergic rhinitis    Anxiety    Attention deficit hyperactivity disorder (ADHD)    Depression    Flatulence/gas pain/belching    History of depression     IBS (irritable bowel syndrome)    Nausea    Obesity    Visual impairment    glasses    Vomiting    Wears glasses      Past Surgical History:   Procedure Laterality Date    Cholecystectomy      Removal gallbladder      Removal gallbladder      2021      Family History   Problem Relation Age of Onset    Depression Mother     Anxiety Mother     Ulcerative Colitis Mother     Ovarian Cancer Maternal Grandmother     Hypertension Maternal Grandmother     Prostate Cancer Maternal Grandfather     Cancer Maternal Grandfather         Prostate cancer    Bipolar Disorder Sister         mood disorder    ADHD Sister     Depression Other         mat aunt    Anxiety Other         mat aunt    Alcohol and Other Disorders Associated Other         pat grt gf, pat grt uncle, pat aunt, pat 2nd cousin    Substance Abuse Other         pat 2nd cousin      Social History     Socioeconomic History    Marital status: Single   Tobacco Use    Smoking status: Never     Passive exposure: Never    Smokeless tobacco: Never    Tobacco comments:     Used to vape for about 2 years, have since quit   Vaping Use    Vaping status: Never Used   Substance and Sexual Activity    Alcohol use: No    Drug use: Yes     Types: Cannabis     Comment: last use 2/8/2024- agrees to abstain   Other Topics Concern    Caffeine Concern No    Exercise No    Seat Belt No    Special Diet No    Stress Concern No    Weight Concern No           REVIEW OF SYSTEMS:     10 point ROS completed and was negative, except for pertinent positive and negatives stated in subjective.       EXAM:     GENERAL: well developed, well nourished, in no apparent distress  EXTREMITIES:  1. Integument: Skin appears moist, warm, and supple with positive hair growth. There are no color changes. No open lesions. No macerations.  Hyperkeratotic lesion noted to plantar medial aspect of left heel.  Upon debridement, there is pinpoint bleeding noted with a break in skin lines to the central portion of the  lesion consistent with verrucoid tissue.  Lesion measured approximately 0.5 cm x 0.5 cm today.  2. Vascular: Dorsalis pedis 2/4 bilateral and posterior tibial pulses 2/4 bilateral, capillary refill normal.  3. Neurological: Gross sensation intact via light touch bilaterally.  Normal sharp/dull sensation  4. Musculoskeletal: Some mild discomfort with palpation to hyperkeratotic lesion listed above to the left heel.  No acute deformities present.       ASSESSMENT AND PLAN:   Diagnoses and all orders for this visit:    Plantar wart of left foot    Left foot pain        Plan:   -Patient was seen and evaluated today in clinic.  Chart history reviewed.    Discussed the etiology of plantar verruca and that this is of viral origin.   Reviewed the various treatment options including topical medications, freezing agents, cauterization and surgical excision.  Discussed potential for return specifically to prior location.  Verruca lesions were sharply pared with curette to pinpoint bleeding.  Pt elects conservative treatment as listed below:  Patient was supplied with and will start utilizing Mediplast just over verrucoid tissue daily.  Patient will change every 1-2 days.  Okay for patient to utilize duct tape over the Mediplast.    Site was covered with bacitracin and Band-Aid today.    Patient was also provided with offloading horseshoe pads today.    Patient should monitor for any signs of infection and contact my office and seek immediate medical attention if noticing any signs.    Will discuss hyfrecation at next visit if patient has had minimal/no improvements    -The patient indicates understanding of these issues and agrees to the plan.    Time spent reviewing pertinent information from patient's chart, reviewing any pertinent imaging, obtaining history and physical exam, discussing and mutually agreeing on a treatment plan, and documenting encounter: 30 minutes    RTC 6 weeks      Aston Hollins DPM         6/14/2024    Dragon speech recognition software was used to prepare this note.  Errors in word recognition may occur.  Please contact me with any questions/concerns with this note.

## 2024-08-17 ENCOUNTER — OFFICE VISIT (OUTPATIENT)
Dept: FAMILY MEDICINE CLINIC | Facility: CLINIC | Age: 19
End: 2024-08-17
Payer: COMMERCIAL

## 2024-08-17 VITALS
BODY MASS INDEX: 28.77 KG/M2 | RESPIRATION RATE: 16 BRPM | WEIGHT: 201 LBS | HEIGHT: 70 IN | SYSTOLIC BLOOD PRESSURE: 112 MMHG | TEMPERATURE: 99 F | HEART RATE: 86 BPM | DIASTOLIC BLOOD PRESSURE: 82 MMHG | OXYGEN SATURATION: 98 %

## 2024-08-17 DIAGNOSIS — R11.2 NAUSEA AND VOMITING, UNSPECIFIED VOMITING TYPE: Primary | ICD-10-CM

## 2024-08-17 DIAGNOSIS — F12.90 CANNABIS USE DISORDER: ICD-10-CM

## 2024-08-17 PROCEDURE — 3074F SYST BP LT 130 MM HG: CPT | Performed by: PHYSICIAN ASSISTANT

## 2024-08-17 PROCEDURE — 99214 OFFICE O/P EST MOD 30 MIN: CPT | Performed by: PHYSICIAN ASSISTANT

## 2024-08-17 PROCEDURE — 3008F BODY MASS INDEX DOCD: CPT | Performed by: PHYSICIAN ASSISTANT

## 2024-08-17 PROCEDURE — 3079F DIAST BP 80-89 MM HG: CPT | Performed by: PHYSICIAN ASSISTANT

## 2024-08-17 RX ORDER — ONDANSETRON 4 MG/1
4 TABLET, FILM COATED ORAL EVERY 12 HOURS PRN
Qty: 10 TABLET | Refills: 0 | Status: SHIPPED | OUTPATIENT
Start: 2024-08-17 | End: 2024-08-22

## 2024-08-17 NOTE — PATIENT INSTRUCTIONS
Zofran 4 mg every 12 hours as needed for nausea/vomiting.         Follow up with your primary care provider if your symptoms fail to improve and resolve as anticipated    Go to the Immediate Care or Emergency Department in event of new or worsening symptoms at any time

## 2024-08-30 ENCOUNTER — OFFICE VISIT (OUTPATIENT)
Dept: FAMILY MEDICINE CLINIC | Facility: CLINIC | Age: 19
End: 2024-08-30
Payer: COMMERCIAL

## 2024-08-30 VITALS
TEMPERATURE: 98 F | DIASTOLIC BLOOD PRESSURE: 62 MMHG | HEART RATE: 75 BPM | SYSTOLIC BLOOD PRESSURE: 132 MMHG | RESPIRATION RATE: 16 BRPM | OXYGEN SATURATION: 98 %

## 2024-08-30 DIAGNOSIS — Z20.822 SUSPECTED COVID-19 VIRUS INFECTION: Primary | ICD-10-CM

## 2024-08-30 PROCEDURE — 87635 SARS-COV-2 COVID-19 AMP PRB: CPT | Performed by: FAMILY MEDICINE

## 2024-08-30 PROCEDURE — 3078F DIAST BP <80 MM HG: CPT | Performed by: FAMILY MEDICINE

## 2024-08-30 PROCEDURE — 3075F SYST BP GE 130 - 139MM HG: CPT | Performed by: FAMILY MEDICINE

## 2024-08-30 PROCEDURE — 99213 OFFICE O/P EST LOW 20 MIN: CPT | Performed by: FAMILY MEDICINE

## 2024-08-30 NOTE — PROGRESS NOTES
Slava Gates is a 19 year old male.    S:  Patient presents today with the following concerns:  Chief Complaint   Patient presents with    Body ache and/or chills     Fatigue s/s since AM, pt exposed to covid.   Lives with grandparents and they have Covid currently.    He has never had Covid before.  No dyspnea.  No N/V/D.     Current Outpatient Medications   Medication Sig Dispense Refill    VITAMIN D3 125 MCG (5000 UT) Oral Cap Take by mouth every morning. Authorized by: EARL ROBERTS      hydrOXYzine 50 MG Oral Tab One tablet in am and one tablet daily as needed for anxiety 60 tablet 0    fluvoxaMINE 50 MG Oral Tab Take 1 tablet (50 mg total) by mouth 2 (two) times daily. (Patient taking differently: Take 1 tablet (50 mg total) by mouth every morning.) 60 tablet 0    busPIRone 15 MG Oral Tab Take 1 tablet (15 mg total) by mouth daily. 30 tablet 0    cyanocobalamin 100 MCG Oral Tab Take 1 tablet (100 mcg total) by mouth daily. 30 tablet 0    traZODone 50 MG Oral Tab Take 1 tablet (50 mg total) by mouth nightly as needed for Sleep (sleep/insomnia). 30 tablet 0     Patient Active Problem List   Diagnosis    Asperger's syndrome (HCC)    Mood disorder (HCC)    Anxiety disorder    Attention deficit hyperactivity disorder (ADHD), combined type    Abdominal pain, epigastric    Diarrhea, unspecified    Nausea    Severe recurrent major depression without psychotic features (HCC)     Family History   Problem Relation Age of Onset    Depression Mother     Anxiety Mother     Ulcerative Colitis Mother     Ovarian Cancer Maternal Grandmother     Hypertension Maternal Grandmother     Prostate Cancer Maternal Grandfather     Cancer Maternal Grandfather         Prostate cancer    Bipolar Disorder Sister         mood disorder    ADHD Sister     Depression Other         mat aunt    Anxiety Other         mat aunt    Alcohol and Other Disorders Associated Other         pat grt gf, pat grt uncle, pat aunt, pat 2nd cousin     Substance Abuse Other         pat 2nd cousin       REVIEW OF SYSTEMS:  GENERAL: feels well otherwise  SKIN: denies any unusual skin lesions  EYES:denies vision change  LUNGS: denies shortness of breath with exertion  CARDIOVASCULAR: denies chest pain on exertion  GI: denies abdominal pain.  No N/V/D/C  : denies dysuria  MUSCULOSKELETAL: denies back pain  NEURO: denies headaches    EXAM:  /62   Pulse 75   Temp 98.2 °F (36.8 °C) (Oral)   Resp 16   SpO2 98%   Physical Exam  Constitutional:       General: He is not in acute distress.     Appearance: Normal appearance. He is not ill-appearing, toxic-appearing or diaphoretic.   HENT:      Head: Normocephalic and atraumatic.      Right Ear: Tympanic membrane and ear canal normal.      Left Ear: Tympanic membrane and ear canal normal.      Mouth/Throat:      Mouth: Mucous membranes are moist.      Pharynx: Oropharynx is clear.   Eyes:      Extraocular Movements: Extraocular movements intact.      Conjunctiva/sclera: Conjunctivae normal.      Pupils: Pupils are equal, round, and reactive to light.   Cardiovascular:      Rate and Rhythm: Normal rate and regular rhythm.      Heart sounds: Normal heart sounds.   Pulmonary:      Effort: Pulmonary effort is normal.      Breath sounds: Normal breath sounds.   Musculoskeletal:      Cervical back: Neck supple. No rigidity or tenderness.   Lymphadenopathy:      Cervical: No cervical adenopathy.   Skin:     General: Skin is warm and dry.      Findings: No rash.   Neurological:      General: No focal deficit present.      Mental Status: He is alert and oriented to person, place, and time.   Psychiatric:         Mood and Affect: Mood normal.         Behavior: Behavior normal.        ASSESSMENT AND PLAN:  Slava Gates is a 19 year old male.  Encounter Diagnosis   Name Primary?    Suspected COVID-19 virus infection Yes       No results found.     Orders Placed This Encounter   Procedures    COVID-19 Testing by PCR  (Stephen) [E]     Meds & Refills for this Visit:  Requested Prescriptions      No prescriptions requested or ordered in this encounter     Imaging & Consults:  None  Covid testing sent out.    Recommend staying home from work.  Note written.  Fluids, steam, rest.  OTC medications as needed.  Vit D, Vit C and zinc.    Follow up if symptoms change, worsen, do not improve.    Go to ED with dyspnea or chest pain.    Patient verbalizes understanding of plan.  No follow-ups on file.

## 2024-08-31 LAB — SARS-COV-2 RNA RESP QL NAA+PROBE: NOT DETECTED

## 2024-09-17 ENCOUNTER — TELEPHONE (OUTPATIENT)
Dept: FAMILY MEDICINE CLINIC | Facility: CLINIC | Age: 19
End: 2024-09-17

## 2024-09-17 NOTE — TELEPHONE ENCOUNTER
Patient scheduled Mychart appointment for Discomfort in right abdomen after eating, frequent bowel movements with difficulty and discomfort. Please call patient.       Appointment scheduled for 9/19/24  LOV 5/28/2024

## 2024-11-11 ENCOUNTER — HOSPITAL ENCOUNTER (OUTPATIENT)
Age: 19
Discharge: HOME | End: 2024-11-11
Payer: COMMERCIAL

## 2024-11-11 ENCOUNTER — APPOINTMENT (OUTPATIENT)
Dept: CT IMAGING | Age: 19
End: 2024-11-11
Attending: NURSE PRACTITIONER
Payer: COMMERCIAL

## 2024-11-11 VITALS
OXYGEN SATURATION: 98 % | HEART RATE: 62 BPM | DIASTOLIC BLOOD PRESSURE: 82 MMHG | BODY MASS INDEX: 25.77 KG/M2 | SYSTOLIC BLOOD PRESSURE: 137 MMHG | HEIGHT: 70 IN | TEMPERATURE: 97 F | RESPIRATION RATE: 19 BRPM | WEIGHT: 180 LBS

## 2024-11-11 DIAGNOSIS — A08.4 VIRAL GASTROENTERITIS: Primary | ICD-10-CM

## 2024-11-11 LAB
BASOPHILS # BLD AUTO: 0.03 X10(3) UL (ref 0–0.2)
BASOPHILS NFR BLD AUTO: 0.3 %
BILIRUB UR QL STRIP: NEGATIVE
BUN BLD-MCNC: 11 MG/DL (ref 7–18)
CHLORIDE BLD-SCNC: 104 MMOL/L (ref 98–112)
CLARITY UR: CLEAR
CO2 BLD-SCNC: 24 MMOL/L (ref 21–32)
COLOR UR: YELLOW
CREAT BLD-MCNC: 1 MG/DL
EGFRCR SERPLBLD CKD-EPI 2021: 110 ML/MIN/1.73M2 (ref 60–?)
EOSINOPHIL # BLD AUTO: 0.02 X10(3) UL (ref 0–0.7)
EOSINOPHIL NFR BLD AUTO: 0.2 %
ERYTHROCYTE [DISTWIDTH] IN BLOOD BY AUTOMATED COUNT: 13.1 %
GLUCOSE BLD-MCNC: 101 MG/DL (ref 70–99)
GLUCOSE UR STRIP-MCNC: NEGATIVE MG/DL
HCT VFR BLD AUTO: 43.7 %
HCT VFR BLD AUTO: 44.6 %
HCT VFR BLD CALC: 48 %
HGB BLD-MCNC: 14.8 G/DL
HGB BLD-MCNC: 15.4 G/DL
IMM GRANULOCYTES # BLD AUTO: 0.05 X10(3) UL (ref 0–1)
IMM GRANULOCYTES NFR BLD: 0.5 %
ISTAT IONIZED CALCIUM FOR CHEM 8: 1.22 MMOL/L (ref 1.12–1.32)
KETONES UR STRIP-MCNC: NEGATIVE MG/DL
LEUKOCYTE ESTERASE UR QL STRIP: NEGATIVE
LYMPHOCYTES # BLD AUTO: 0.91 X10(3) UL (ref 1–4)
LYMPHOCYTES NFR BLD AUTO: 9.4 %
MCH RBC QN AUTO: 30.3 PG (ref 26–34)
MCH RBC QN AUTO: 31.4 PG (ref 26–34)
MCHC RBC AUTO-ENTMCNC: 33.2 G/DL (ref 31–37)
MCHC RBC AUTO-ENTMCNC: 35.2 G/DL (ref 31–37)
MCV RBC AUTO: 89.2 FL
MCV RBC AUTO: 91.4 FL (ref 80–100)
MONOCYTES # BLD AUTO: 1.12 X10(3) UL (ref 0.1–1)
MONOCYTES NFR BLD AUTO: 11.6 %
NEUTROPHILS # BLD AUTO: 7.55 X10 (3) UL (ref 1.5–7.7)
NEUTROPHILS # BLD AUTO: 7.55 X10(3) UL (ref 1.5–7.7)
NEUTROPHILS NFR BLD AUTO: 78 %
NITRITE UR QL STRIP: NEGATIVE
PH UR STRIP: 6 [PH]
PLATELET # BLD AUTO: 251 X10ˆ3/UL (ref 150–450)
PLATELET # BLD AUTO: 260 10(3)UL (ref 150–450)
POCT INFLUENZA A: NEGATIVE
POCT INFLUENZA B: NEGATIVE
POTASSIUM BLD-SCNC: 3.8 MMOL/L (ref 3.6–5.1)
PROT UR STRIP-MCNC: NEGATIVE MG/DL
RBC # BLD AUTO: 4.88 X10ˆ6/UL
RBC # BLD AUTO: 4.9 X10(6)UL
SARS-COV-2 RNA RESP QL NAA+PROBE: NOT DETECTED
SODIUM BLD-SCNC: 139 MMOL/L (ref 136–145)
SP GR UR STRIP: 1.01
UROBILINOGEN UR STRIP-ACNC: <2 MG/DL
WBC # BLD AUTO: 9.7 X10(3) UL (ref 4–11)
WBC # BLD AUTO: 9.9 X10ˆ3/UL (ref 4–11)

## 2024-11-11 PROCEDURE — 99214 OFFICE O/P EST MOD 30 MIN: CPT

## 2024-11-11 PROCEDURE — 36415 COLL VENOUS BLD VENIPUNCTURE: CPT

## 2024-11-11 PROCEDURE — 85025 COMPLETE CBC W/AUTO DIFF WBC: CPT | Performed by: NURSE PRACTITIONER

## 2024-11-11 PROCEDURE — 99215 OFFICE O/P EST HI 40 MIN: CPT

## 2024-11-11 PROCEDURE — 81002 URINALYSIS NONAUTO W/O SCOPE: CPT

## 2024-11-11 PROCEDURE — 87502 INFLUENZA DNA AMP PROBE: CPT | Performed by: NURSE PRACTITIONER

## 2024-11-11 PROCEDURE — 74177 CT ABD & PELVIS W/CONTRAST: CPT | Performed by: NURSE PRACTITIONER

## 2024-11-11 PROCEDURE — 80047 BASIC METABLC PNL IONIZED CA: CPT

## 2024-11-11 RX ORDER — FAMOTIDINE 20 MG/1
20 TABLET, FILM COATED ORAL ONCE
Status: COMPLETED | OUTPATIENT
Start: 2024-11-11 | End: 2024-11-11

## 2024-11-11 RX ORDER — ONDANSETRON 4 MG/1
4 TABLET, ORALLY DISINTEGRATING ORAL EVERY 4 HOURS PRN
Qty: 10 TABLET | Refills: 0 | Status: SHIPPED | OUTPATIENT
Start: 2024-11-11 | End: 2024-11-18

## 2024-11-11 NOTE — ED INITIAL ASSESSMENT (HPI)
Abdominal pain-burning  on mid  upper abdomen started at 6 am..  denies any nausea, vomiting diarrhea, fever.  Last BM  today.   Took tums,peptobismol. But no improvement. Pain worse when moving

## 2024-11-11 NOTE — ED PROVIDER NOTES
Patient Seen in: [facility]      History     Chief Complaint   Patient presents with    Abdominal Pain     Stated Complaint: stomach pain    Subjective:   HPI      19-year-old male presents today with complaints of diffuse abdominal pain for the past couple hours.  Patient states he has been trying to eat pretzels and Sprite with little relief.  Patient states he took over-the-counter stomach pain medication with little relief.  Patient states his stomach hurts more with movement.    Objective:     Past Medical History:    Abdominal pain    Allergic rhinitis    Anxiety    Attention deficit hyperactivity disorder (ADHD)    Depression    Flatulence/gas pain/belching    History of depression    IBS (irritable bowel syndrome)    Nausea    Obesity    Visual impairment    glasses    Vomiting    Wears glasses              Past Surgical History:   Procedure Laterality Date    Cholecystectomy      Removal gallbladder      Removal gallbladder      2021                No pertinent social history.            Review of Systems   Constitutional: Negative.    HENT: Negative.     Eyes: Negative.    Respiratory: Negative.     Cardiovascular: Negative.    Gastrointestinal:  Positive for abdominal distention.   Endocrine: Negative.    Genitourinary: Negative.    Musculoskeletal: Negative.    Skin: Negative.    Allergic/Immunologic: Negative.    Neurological: Negative.    Hematological: Negative.    Psychiatric/Behavioral: Negative.         Positive for stated complaint: stomach pain  Other systems are as noted in HPI.  Constitutional and vital signs reviewed.      All other systems reviewed and negative except as noted above.    Physical Exam     ED Triage Vitals [11/11/24 1408]   /82   Pulse 83   Resp 19   Temp 97 °F (36.1 °C)   Temp src Temporal   SpO2 98 %   O2 Device None (Room air)       Current Vitals:   Vital Signs  BP: 137/82  Pulse: 62  Resp: 19  Temp: 97 °F (36.1 °C)  Temp src: Temporal    Oxygen  Therapy  SpO2: 98 %  O2 Device: None (Room air)        Physical Exam  Vitals and nursing note reviewed.   Constitutional:       Appearance: He is well-developed.   HENT:      Head: Normocephalic.      Mouth/Throat:      Mouth: Mucous membranes are moist.      Pharynx: Oropharynx is clear.   Eyes:      Extraocular Movements: Extraocular movements intact.      Pupils: Pupils are equal, round, and reactive to light.   Cardiovascular:      Rate and Rhythm: Normal rate and regular rhythm.      Heart sounds: Normal heart sounds.   Pulmonary:      Effort: Pulmonary effort is normal.      Breath sounds: Normal breath sounds.   Abdominal:      General: Abdomen is flat. Bowel sounds are normal.      Palpations: Abdomen is soft.      Tenderness: There is generalized abdominal tenderness. There is guarding.   Neurological:      General: No focal deficit present.      Mental Status: He is alert.   Psychiatric:         Mood and Affect: Mood normal.           ED Course     Labs Reviewed   [facility] POCT URINALYSIS DIPSTICK - Abnormal; Notable for the following components:       Result Value    Blood, Urine Trace-Intact (*)     All other components within normal limits   POCT ISTAT CHEM8 CARTRIDGE - Abnormal; Notable for the following components:    ISTAT Glucose 101 (*)     All other components within normal limits   RAPID SARS-COV-2 BY PCR - Normal   POCT FLU TEST - Normal    Narrative:     This assay is a rapid molecular in vitro test utilizing nucleic acid amplification of influenza A and B viral RNA.   CBC W AUTO DIFF   POCT CBC       ED Course as of 11/11/24 1637  ------------------------------------------------------------  Time: 11/11 1615  Comment: Patient came out of the room complaining of a rash to his anterior chest after undergoing the CT scan with IV dye.  Patient denies any shellfish or iodine allergy.              MDM      19-year-old male presents today with complaints of diffuse abdominal pain for the past couple  hours.  Patient states he has been trying to eat pretzels and Sprite with little relief.  Patient states he took over-the-counter stomach pain medication with little relief.  Patient states his stomach hurts more with movement.  Vital signs: Please see EMR.  Physical exam: Please see exam.  Differential diagnosis: COVID, flu, viral gastroenteritis, anxiety/depression, diverticulitis, cholecystitis, kidney stone.   Recent Results (from the past 24 hours)   Rapid SARS-CoV-2 by PCR    Collection Time: 11/11/24  2:43 PM    Specimen: Nares; Other   Result Value Ref Range    Rapid SARS-CoV-2 by PCR Not Detected Not Detected   POCT Flu Test    Collection Time: 11/11/24  2:43 PM    Specimen: Nares; Other   Result Value Ref Range    POCT INFLUENZA A Negative Negative    POCT INFLUENZA B Negative Negative   POCT Urinalysis Dipstick    Collection Time: 11/11/24  3:27 PM   Result Value Ref Range    Urine Color Yellow Yellow    Urine Clarity Clear Clear    Specific Gravity, Urine 1.015 1.005 - 1.030    PH, Urine 6.0 5.0 - 8.0    Protein urine Negative Negative mg/dL    Glucose, Urine Negative Negative mg/dL    Ketone, Urine Negative Negative mg/dL    Bilirubin, Urine Negative Negative    Blood, Urine Trace-Intact (A) Negative    Nitrite Urine Negative Negative    Urobilinogen urine <2.0 <2.0 mg/dL    Leukocyte esterase urine Negative Negative   POCT CBC    Collection Time: 11/11/24  3:44 PM   Result Value Ref Range    WBC IC 9.9 4.0 - 11.0 x10ˆ3/uL    RBC IC 4.88 4.30 - 5.70 X10ˆ6/uL    HGB IC 14.8 13.0 - 17.5 g/dL    HCT IC 44.6 39.0 - 53.0 %    MCV IC 91.4 80.0 - 100.0 fL    MCH IC 30.3 26.0 - 34.0 pg    MCHC IC 33.2 31.0 - 37.0 g/dL    PLT .0 150.0 - 450.0 X10ˆ3/uL    # Neutrophil      # Lymphocyte      # Mixed Cells      Neutrophil %      Lymphocyte %      Mixed Cell %     POCT ISTAT chem8 cartridge    Collection Time: 11/11/24  3:46 PM   Result Value Ref Range    ISTAT Sodium 139 136 - 145 mmol/L    ISTAT BUN 11 7 -  18 mg/dL    ISTAT Potassium 3.8 3.6 - 5.1 mmol/L    ISTAT Chloride 104 98 - 112 mmol/L    ISTAT Ionized Calcium 1.22 1.12 - 1.32 mmol/L    ISTAT Hematocrit 48 37 - 53 %    ISTAT Glucose 101 (H) 70 - 99 mg/dL    ISTAT TCO2 24 21 - 32 mmol/L    ISTAT Sample Type Venous     ISTAT Creatinine 1.00 0.70 - 1.30 mg/dL    eGFR-Cr 110 >=60 mL/min/1.73m2   Based on physical exam and HPI will diagnosed with viral gastroenteritis and treat supportively.  Prescribe patient Zofran.  Patient instructed to replace his toothbrush.          Medical Decision Making  19-year-old male presents today with complaints of diffuse abdominal pain for the past couple hours.  Patient states he has been trying to eat pretzels and Sprite with little relief.  Patient states he took over-the-counter stomach pain medication with little relief.  Patient states his stomach hurts more with movement.    Problems Addressed:  Viral gastroenteritis: acute illness or injury    Amount and/or Complexity of Data Reviewed  Labs: ordered. Decision-making details documented in ED Course.  Radiology: ordered. Decision-making details documented in ED Course.    Risk  Prescription drug management.        Disposition and Plan     Clinical Impression:  1. Viral gastroenteritis         Disposition:  Discharge  11/11/2024  4:37 pm    Follow-up:  Patt Mcdonnell MD  [id number]  [facility] [id number]  [phone]    In 1 week            Medications Prescribed:  Current Discharge Medication List        START taking these medications    Details   ondansetron 4 MG Oral Tablet Dispersible Take 1 tablet (4 mg total) by mouth every 4 (four) hours as needed for Nausea.  Qty: 10 tablet, Refills: 0                 Supplementary Documentation:

## 2025-01-16 ENCOUNTER — LAB ENCOUNTER (OUTPATIENT)
Dept: LAB | Age: 20
End: 2025-01-16
Attending: NURSE PRACTITIONER
Payer: COMMERCIAL

## 2025-01-16 ENCOUNTER — OFFICE VISIT (OUTPATIENT)
Dept: FAMILY MEDICINE CLINIC | Facility: CLINIC | Age: 20
End: 2025-01-16
Payer: COMMERCIAL

## 2025-01-16 VITALS
SYSTOLIC BLOOD PRESSURE: 110 MMHG | WEIGHT: 193 LBS | DIASTOLIC BLOOD PRESSURE: 80 MMHG | OXYGEN SATURATION: 98 % | HEART RATE: 88 BPM | BODY MASS INDEX: 27.63 KG/M2 | RESPIRATION RATE: 16 BRPM | HEIGHT: 70 IN

## 2025-01-16 DIAGNOSIS — Z13.21 ENCOUNTER FOR VITAMIN DEFICIENCY SCREENING: ICD-10-CM

## 2025-01-16 DIAGNOSIS — Z00.00 ROUTINE GENERAL MEDICAL EXAMINATION AT A HEALTH CARE FACILITY: Primary | ICD-10-CM

## 2025-01-16 DIAGNOSIS — Z00.00 LABORATORY EXAM ORDERED AS PART OF ROUTINE GENERAL MEDICAL EXAMINATION: ICD-10-CM

## 2025-01-16 LAB
BASOPHILS # BLD AUTO: 0.04 X10(3) UL (ref 0–0.2)
BASOPHILS NFR BLD AUTO: 0.5 %
EOSINOPHIL # BLD AUTO: 0.09 X10(3) UL (ref 0–0.7)
EOSINOPHIL NFR BLD AUTO: 1.1 %
ERYTHROCYTE [DISTWIDTH] IN BLOOD BY AUTOMATED COUNT: 12.8 %
FOLATE SERPL-MCNC: 20.7 NG/ML (ref 5.4–?)
HCT VFR BLD AUTO: 45.3 %
HGB BLD-MCNC: 15.8 G/DL
IMM GRANULOCYTES # BLD AUTO: 0.04 X10(3) UL (ref 0–1)
IMM GRANULOCYTES NFR BLD: 0.5 %
LYMPHOCYTES # BLD AUTO: 2.63 X10(3) UL (ref 1–4)
LYMPHOCYTES NFR BLD AUTO: 31.2 %
MCH RBC QN AUTO: 31.3 PG (ref 26–34)
MCHC RBC AUTO-ENTMCNC: 34.9 G/DL (ref 31–37)
MCV RBC AUTO: 89.7 FL
MONOCYTES # BLD AUTO: 0.84 X10(3) UL (ref 0.1–1)
MONOCYTES NFR BLD AUTO: 10 %
NEUTROPHILS # BLD AUTO: 4.79 X10 (3) UL (ref 1.5–7.7)
NEUTROPHILS # BLD AUTO: 4.79 X10(3) UL (ref 1.5–7.7)
NEUTROPHILS NFR BLD AUTO: 56.7 %
PLATELET # BLD AUTO: 313 10(3)UL (ref 150–450)
RBC # BLD AUTO: 5.05 X10(6)UL
TSI SER-ACNC: 1.19 UIU/ML (ref 0.55–4.78)
VIT B12 SERPL-MCNC: 560 PG/ML (ref 211–911)
VIT D+METAB SERPL-MCNC: 36 NG/ML (ref 30–100)
WBC # BLD AUTO: 8.4 X10(3) UL (ref 4–11)

## 2025-01-16 PROCEDURE — 82607 VITAMIN B-12: CPT | Performed by: NURSE PRACTITIONER

## 2025-01-16 PROCEDURE — 84443 ASSAY THYROID STIM HORMONE: CPT | Performed by: NURSE PRACTITIONER

## 2025-01-16 PROCEDURE — 85025 COMPLETE CBC W/AUTO DIFF WBC: CPT | Performed by: NURSE PRACTITIONER

## 2025-01-16 PROCEDURE — 82306 VITAMIN D 25 HYDROXY: CPT | Performed by: NURSE PRACTITIONER

## 2025-01-16 PROCEDURE — 82746 ASSAY OF FOLIC ACID SERUM: CPT | Performed by: NURSE PRACTITIONER

## 2025-01-16 NOTE — PROGRESS NOTES
Slava Gates is a 19 year old male who presents for a complete physical exam.   HPI:        Chief Complaint   Patient presents with    Wellness Visit       Patient is present for complete physical. Feels very well. Up to date with dental visits.No hearing problems. Vaccinations: up to date.  Headaches - for couple months , 1 hr after taking Excedrin  medicine  , feels like pressure , comes randomly  ,       Wt Readings from Last 3 Encounters:   01/16/25 193 lb (87.5 kg)   11/11/24 180 lb (81.6 kg)   08/17/24 201 lb (91.2 kg)      BP Readings from Last 3 Encounters:   01/16/25 110/80   11/11/24 137/82   08/30/24 132/62       Cholesterol, Total (mg/dL)   Date Value   09/14/2022 108     HDL Cholesterol (mg/dL)   Date Value   09/14/2022 39 (L)     LDL Cholesterol (mg/dL)   Date Value   09/14/2022 53     AST (U/L)   Date Value   02/04/2024 14 (L)   11/29/2023 10 (L)   11/30/2022 21     ALT (U/L)   Date Value   02/04/2024 36   11/29/2023 30   11/30/2022 41      Current Outpatient Medications   Medication Sig Dispense Refill    VITAMIN D3 125 MCG (5000 UT) Oral Cap Take by mouth every morning. Authorized by: EARL ROBERTS      fluvoxaMINE 50 MG Oral Tab Take 1 tablet (50 mg total) by mouth 2 (two) times daily. 60 tablet 0    busPIRone 15 MG Oral Tab Take 1 tablet (15 mg total) by mouth daily. 30 tablet 0    cyanocobalamin 100 MCG Oral Tab Take 1 tablet (100 mcg total) by mouth daily. 30 tablet 0    traZODone 50 MG Oral Tab Take 1 tablet (50 mg total) by mouth nightly as needed for Sleep (sleep/insomnia). 30 tablet 0      Past Medical History:    Abdominal pain    Allergic rhinitis    Anxiety    Attention deficit hyperactivity disorder (ADHD)    Depression    Flatulence/gas pain/belching    History of depression    IBS (irritable bowel syndrome)    Nausea    Obesity    Visual impairment    glasses    Vomiting    Wears glasses      Past Surgical History:   Procedure Laterality Date    Cholecystectomy      Removal  gallbladder      Removal gallbladder      2021      Family History   Problem Relation Age of Onset    Depression Mother     Anxiety Mother     Ulcerative Colitis Mother     Ovarian Cancer Maternal Grandmother     Hypertension Maternal Grandmother     Prostate Cancer Maternal Grandfather     Cancer Maternal Grandfather         Prostate cancer    Bipolar Disorder Sister         mood disorder    ADHD Sister     Depression Other         mat aunt    Anxiety Other         mat aunt    Alcohol and Other Disorders Associated Other         pat grt gf, pat grt uncle, pat aunt, pat 2nd cousin    Substance Abuse Other         pat 2nd cousin      Social History     Socioeconomic History    Marital status: Single   Tobacco Use    Smoking status: Never     Passive exposure: Never    Smokeless tobacco: Never    Tobacco comments:     Used to vape for about 2 years, have since quit   Vaping Use    Vaping status: Never Used   Substance and Sexual Activity    Alcohol use: No    Drug use: Yes     Types: Cannabis     Comment: last use 2/8/2024- agrees to abstain   Other Topics Concern    Caffeine Concern No    Exercise No    Seat Belt No    Special Diet No    Stress Concern No    Weight Concern No     Social Drivers of Health      Received from [employer] [employer]    [facility]     Exercise: minimal, occasional.  Diet: watches sugar closely and watches somewhat     REVIEW OF SYSTEMS:   GENERAL HEALTH: feels well, no fatigue.  SKIN: denies any unusual skin lesions or rashes  EYES: no visual complaints or deficits  HEENT: denies nasal congestion, sinus pain or sore throat; hearing loss negative.  RESPIRATORY: denies shortness of breath, wheezing or cough   CARDIOVASCULAR: denies chest pain, SOB on exertion,no orthopnea, no edema, no palpitations   GI: denies nausea, vomiting, constipation, diarrhea; no rectal bleeding; no heartburn  GENITAL/: no dysuria, urgency or frequency; no epididymal or testicular pain; no penile discharge;  no hernias; no erectile dysfunction; no nocturia  MUSCULOSKELETAL: no joint complaints upper or lower extremities  NEURO: no sensory or motor complaint  PSYCH: no symptoms of depression or anxiety, no insomnia.  HEMATOLOGY: denies hx anemia; denies bruising or excessive bleeding  ENDOCRINE: denies excessive thirst or urination; denies unexpected wt gain or wt loss  ALLERGY/IMM.: denies food or seasonal allergies  PSYCH: no anxiety, depression, mood issues.    EXAM:   /80   Pulse 88   Resp 16   Ht 5' 10\" (1.778 m)   Wt 193 lb (87.5 kg)   SpO2 98%   BMI 27.69 kg/m²     General: WD/WN in no acute distress.   HEENT: PERRLA and EOMI.  OP moist no lesions. TM normal, canals normal.  NECK: is supple,thyroid- normal. No carotid bruits.    Heart: is RRR.  S1, S2, with no murmurs.    Lungs: are clear to auscultation bilaterally, with no wheeze, rhonchi, or rales.  Heart: is RRR.  S1, S2, with no murmurs.    Abdomen: is soft, NT/ND with no HSM.  No rebound or guarding, NABS.    Extremities: are symmetric with no cyanosis, clubbing, or edema.    Skin: is unremarkable without rashes.    Neuro: no focal abnormalities, and reflexes coordination and gait normal and symmetric.   MUSK/SKEL: Normal muscles tones, no joint abnormalities, full ROM of all extremities.    back- normal curves, no scoliosis, normal gait,  No joint or mulses abnormalities.  Psych: pt is alert, oriented x 3, normal affect.      ASSESSMENT AND PLAN:     Slava Gates is a 19 year old male who presents for a complete physical exam.   Diagnoses and all orders for this visit:    Routine general medical examination at a health care facility    Laboratory exam ordered as part of routine general medical examination  -     CBC With Differential With Platelet; Future  -     Comp Metabolic Panel (14); Future  -     Lipid Panel; Future  -     Vitamin D; Future  -     TSH W Reflex To Free T4; Future    Encounter for vitamin deficiency screening  -     B12  AND FOLATE [5482] [Q]; Future       Pt's weight is Body mass index is 27.69 kg/m²., recommended low fat diet and aerobic exercise 30 minutes three times weekly.   The patient indicates understanding of these issues and agrees to the plan.  The patient is asked to return for CPX in 1 year.

## 2025-01-28 ENCOUNTER — HOSPITAL ENCOUNTER (OUTPATIENT)
Age: 20
Discharge: HOME | End: 2025-01-28
Attending: EMERGENCY MEDICINE
Payer: COMMERCIAL

## 2025-01-28 VITALS
RESPIRATION RATE: 16 BRPM | DIASTOLIC BLOOD PRESSURE: 67 MMHG | SYSTOLIC BLOOD PRESSURE: 128 MMHG | OXYGEN SATURATION: 97 % | TEMPERATURE: 98 F | HEART RATE: 112 BPM

## 2025-01-28 DIAGNOSIS — S39.012A STRAIN OF MUSCLE, FASCIA AND TENDON OF LOWER BACK, INITIAL ENCOUNTER: Primary | ICD-10-CM

## 2025-01-28 LAB
BILIRUB UR QL STRIP: NEGATIVE
CLARITY UR: CLEAR
COLOR UR: YELLOW
GLUCOSE UR STRIP-MCNC: NEGATIVE MG/DL
HGB UR QL STRIP: NEGATIVE
KETONES UR STRIP-MCNC: NEGATIVE MG/DL
LEUKOCYTE ESTERASE UR QL STRIP: NEGATIVE
NITRITE UR QL STRIP: NEGATIVE
PH UR STRIP: 6 [PH]
PROT UR STRIP-MCNC: NEGATIVE MG/DL
SP GR UR STRIP: 1.02
UROBILINOGEN UR STRIP-ACNC: <2 MG/DL

## 2025-01-28 PROCEDURE — 99213 OFFICE O/P EST LOW 20 MIN: CPT

## 2025-01-28 PROCEDURE — 99212 OFFICE O/P EST SF 10 MIN: CPT

## 2025-01-28 PROCEDURE — 81002 URINALYSIS NONAUTO W/O SCOPE: CPT

## 2025-01-28 NOTE — ED PROVIDER NOTES
Patient Seen in: [facility]      History     Chief Complaint   Patient presents with    Back Pain     Stated Complaint: Back/Tailbone Pain    Subjective:   HPI      About a month ago, patient was at work.  He was pushing heavy vehicle and afterwards has had an unusual sensation.  Ever since then, patient reports that he feels a dripping sensation in his lower back.  He feels like there is wetness across his lower back and sacrum, sometimes even trickling down his leg.  There is no actual drainage.  There is never any abscess or sore.  There is no actual moisture on his clothing.  Patient was a general soreness across the lower back and sacrum but no radiation of the pain down the leg.  No bladder or bowel incontinence. Patient is reporting frequency of urination but without dysuria.  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  He noted that there was some mucus in streaks of blood on the stool that he passed but no black stool and he has not had any symptoms since.    Objective:     Past Medical History:    Abdominal pain    Allergic rhinitis    Anxiety    Attention deficit hyperactivity disorder (ADHD)    Depression    Flatulence/gas pain/belching    History of depression    IBS (irritable bowel syndrome)    Nausea    Obesity    Visual impairment    glasses    Vomiting    Wears glasses              Past Surgical History:   Procedure Laterality Date    Cholecystectomy      Removal gallbladder      Removal gallbladder      2021                Social History     Socioeconomic History    Marital status: Single   Tobacco Use    Smoking status: Never     Passive exposure: Never    Smokeless tobacco: Never    Tobacco comments:     Used to vape for about 2 years, have since quit   Vaping Use    Vaping status: Never Used   Substance and Sexual Activity    Alcohol use: No    Drug use: Yes     Types: Cannabis     Comment: last use 2/8/2024- agrees to abstain   Other  Topics Concern    Caffeine Concern No    Exercise No    Seat Belt No    Special Diet No    Stress Concern No    Weight Concern No     Social Drivers of Health      [facility] [employer]    [facility]              Review of Systems    Positive for stated complaint: Back/Tailbone Pain  Other systems are as noted in HPI.  Constitutional and vital signs reviewed.      All other systems reviewed and negative except as noted above.    Physical Exam     ED Triage Vitals [01/28/25 1538]   /67   Pulse 112   Resp 16   Temp 98.1 °F (36.7 °C)   Temp src Oral   SpO2 97 %   O2 Device None (Room air)       Current Vitals:   Vital Signs  BP: 128/67  Pulse: 112  Resp: 16  Temp: 98.1 °F (36.7 °C)  Temp src: Oral    Oxygen Therapy  SpO2: 97 %  O2 Device: None (Room air)        Physical Exam  General: The patient is awake, alert, conversant.   Eyes: sclera white, conjunctiva pink and moist.  Lids and lashes are normal.  Back: On inspection, no soft tissue swelling or erythema is noted.  The skin overlying the sacrum and superior gluteal fold is unremarkable.  There is no pilonidal cyst.  There is no bruising.  No point tenderness in midline and paraspinal musculature  Strength in the lower extremities excellent and patient can stand on heels and toes and deep knee bend with good strength and coordination      ED Course     Labs Reviewed   [facility] POCT URINALYSIS DIPSTICK                   MDM     Patient complains of feeling of wetness or dripping in the soft tissues of his lower back.  This is an unusual complaint.  Very likely that patient has had some strain from his heavy activity.  This may account for the discomfort that he feels there.  There may be some inflammation giving in the sensation of fullness as well.  He has no radicular complaints to suggest nerve root compression.  No skin change to suggest pilonidal cyst or abscess and there was never actual drainage of any sort that he identified.  He has had  no bladder or bowel incontinence to suggest cauda equina.  He reports frequency of urination but no dysuria.    Urine dip shows negative blood, nitrites, leukocytes.  Negative glucose        Medical Decision Making      Disposition and Plan     Clinical Impression:  1. Strain of muscle, fascia and tendon of lower back, initial encounter         Disposition:  Discharge  1/28/2025  4:08 pm    Follow-up:  No follow-up provider specified.        Medications Prescribed:  Current Discharge Medication List              Supplementary Documentation:

## 2025-01-28 NOTE — DISCHARGE INSTRUCTIONS
Rest  You may apply a warm compress to the area 20 minutes at a time  Motrin or Aleve for pain    Contact your primary care doctor to arrange reevaluation later this week or next

## 2025-02-03 ENCOUNTER — OFFICE VISIT (OUTPATIENT)
Dept: FAMILY MEDICINE CLINIC | Facility: CLINIC | Age: 20
End: 2025-02-03
Payer: COMMERCIAL

## 2025-02-03 ENCOUNTER — LAB ENCOUNTER (OUTPATIENT)
Dept: LAB | Age: 20
End: 2025-02-03
Attending: NURSE PRACTITIONER
Payer: COMMERCIAL

## 2025-02-03 VITALS
HEIGHT: 70 IN | RESPIRATION RATE: 16 BRPM | DIASTOLIC BLOOD PRESSURE: 80 MMHG | WEIGHT: 191 LBS | OXYGEN SATURATION: 98 % | HEART RATE: 107 BPM | BODY MASS INDEX: 27.35 KG/M2 | SYSTOLIC BLOOD PRESSURE: 110 MMHG

## 2025-02-03 DIAGNOSIS — K92.1 BLOOD IN THE STOOL: ICD-10-CM

## 2025-02-03 DIAGNOSIS — M54.16 LUMBAR RADICULOPATHY: Primary | ICD-10-CM

## 2025-02-03 DIAGNOSIS — M53.3 COCCYDYNIA: ICD-10-CM

## 2025-02-03 LAB
ALBUMIN SERPL-MCNC: 5.1 G/DL (ref 3.2–4.8)
ALBUMIN/GLOB SERPL: 1.8 {RATIO} (ref 1–2)
ALP LIVER SERPL-CCNC: 80 U/L
ALT SERPL-CCNC: 21 U/L
ANION GAP SERPL CALC-SCNC: 9 MMOL/L (ref 0–18)
AST SERPL-CCNC: 21 U/L (ref ?–34)
BASOPHILS # BLD AUTO: 0.05 X10(3) UL (ref 0–0.2)
BASOPHILS NFR BLD AUTO: 0.5 %
BILIRUB SERPL-MCNC: 0.7 MG/DL (ref 0.3–1.2)
BUN BLD-MCNC: 9 MG/DL (ref 9–23)
CALCIUM BLD-MCNC: 10.6 MG/DL (ref 8.7–10.6)
CHLORIDE SERPL-SCNC: 101 MMOL/L (ref 98–112)
CO2 SERPL-SCNC: 30 MMOL/L (ref 21–32)
CREAT BLD-MCNC: 1.03 MG/DL
EGFRCR SERPLBLD CKD-EPI 2021: 106 ML/MIN/1.73M2 (ref 60–?)
EOSINOPHIL # BLD AUTO: 0.13 X10(3) UL (ref 0–0.7)
EOSINOPHIL NFR BLD AUTO: 1.4 %
ERYTHROCYTE [DISTWIDTH] IN BLOOD BY AUTOMATED COUNT: 12.6 %
FASTING STATUS PATIENT QL REPORTED: YES
GLOBULIN PLAS-MCNC: 2.8 G/DL (ref 2–3.5)
GLUCOSE BLD-MCNC: 89 MG/DL (ref 70–99)
HCT VFR BLD AUTO: 47.3 %
HGB BLD-MCNC: 16 G/DL
IMM GRANULOCYTES # BLD AUTO: 0.07 X10(3) UL (ref 0–1)
IMM GRANULOCYTES NFR BLD: 0.7 %
LYMPHOCYTES # BLD AUTO: 2.4 X10(3) UL (ref 1–4)
LYMPHOCYTES NFR BLD AUTO: 25.5 %
MCH RBC QN AUTO: 31.1 PG (ref 26–34)
MCHC RBC AUTO-ENTMCNC: 33.8 G/DL (ref 31–37)
MCV RBC AUTO: 91.8 FL
MONOCYTES # BLD AUTO: 0.85 X10(3) UL (ref 0.1–1)
MONOCYTES NFR BLD AUTO: 9 %
NEUTROPHILS # BLD AUTO: 5.9 X10 (3) UL (ref 1.5–7.7)
NEUTROPHILS # BLD AUTO: 5.9 X10(3) UL (ref 1.5–7.7)
NEUTROPHILS NFR BLD AUTO: 62.9 %
OSMOLALITY SERPL CALC.SUM OF ELEC: 288 MOSM/KG (ref 275–295)
PLATELET # BLD AUTO: 344 10(3)UL (ref 150–450)
POTASSIUM SERPL-SCNC: 4.4 MMOL/L (ref 3.5–5.1)
PROT SERPL-MCNC: 7.9 G/DL (ref 5.7–8.2)
RBC # BLD AUTO: 5.15 X10(6)UL
SODIUM SERPL-SCNC: 140 MMOL/L (ref 136–145)
WBC # BLD AUTO: 9.4 X10(3) UL (ref 4–11)

## 2025-02-03 PROCEDURE — 3079F DIAST BP 80-89 MM HG: CPT | Performed by: NURSE PRACTITIONER

## 2025-02-03 PROCEDURE — 3074F SYST BP LT 130 MM HG: CPT | Performed by: NURSE PRACTITIONER

## 2025-02-03 PROCEDURE — 85025 COMPLETE CBC W/AUTO DIFF WBC: CPT | Performed by: NURSE PRACTITIONER

## 2025-02-03 PROCEDURE — 3008F BODY MASS INDEX DOCD: CPT | Performed by: NURSE PRACTITIONER

## 2025-02-03 PROCEDURE — 99214 OFFICE O/P EST MOD 30 MIN: CPT | Performed by: NURSE PRACTITIONER

## 2025-02-03 PROCEDURE — 80053 COMPREHEN METABOLIC PANEL: CPT | Performed by: NURSE PRACTITIONER

## 2025-02-03 RX ORDER — CYCLOBENZAPRINE HCL 10 MG
5 TABLET ORAL NIGHTLY
Qty: 30 TABLET | Refills: 0 | Status: SHIPPED | OUTPATIENT
Start: 2025-02-03

## 2025-02-03 RX ORDER — PANTOPRAZOLE SODIUM 20 MG/1
20 TABLET, DELAYED RELEASE ORAL
Qty: 90 TABLET | Refills: 0 | OUTPATIENT
Start: 2025-02-03

## 2025-02-03 RX ORDER — METHYLPREDNISOLONE 4 MG/1
TABLET ORAL
Qty: 21 EACH | Refills: 0 | Status: SHIPPED | OUTPATIENT
Start: 2025-02-03

## 2025-02-03 RX ORDER — PANTOPRAZOLE SODIUM 20 MG/1
20 TABLET, DELAYED RELEASE ORAL
Qty: 30 TABLET | Refills: 0 | Status: SHIPPED | OUTPATIENT
Start: 2025-02-03 | End: 2025-03-05

## 2025-02-03 NOTE — PROGRESS NOTES
Chief Complaint   Patient presents with    ER F/U         HPI:  Tail bone has been feeling driping sensation , Injury   one month ago was pushing SUV up the hill .feels like trickling sensation down the leg and coccyx, pain is dull  radiation  to the Left leg  .  No new bowel or bladder incontinence.  No weakness.  No numbness or tingling.  Moderate, sitting makes it worse, walking makes it worse.Worsening since the  onset , seen in ER 1/28/25, denies any sores or abscess   Injury: pushing SUV up the hill   Stool yesterday noted blood in the stool , reports having BM  3-5 times per day , stool is hard and formed 3-4 days ago  and 2 times noticed blood in the stool. Reports bright red, not a lot, denies any family history of colon cancer, denies any abdominal pain , no nausea or vomiting.     Current Outpatient Medications   Medication Sig Dispense Refill    VITAMIN D3 125 MCG (5000 UT) Oral Cap Take by mouth every morning. Authorized by: EARL ROBERTS      fluvoxaMINE 50 MG Oral Tab Take 1 tablet (50 mg total) by mouth 2 (two) times daily. 60 tablet 0    busPIRone 15 MG Oral Tab Take 1 tablet (15 mg total) by mouth daily. 30 tablet 0    cyanocobalamin 100 MCG Oral Tab Take 1 tablet (100 mcg total) by mouth daily. 30 tablet 0    traZODone 50 MG Oral Tab Take 1 tablet (50 mg total) by mouth nightly as needed for Sleep (sleep/insomnia). 30 tablet 0      Past Medical History:    Abdominal pain    Allergic rhinitis    Anxiety    Attention deficit hyperactivity disorder (ADHD)    Depression    Flatulence/gas pain/belching    History of depression    IBS (irritable bowel syndrome)    Nausea    Obesity    Visual impairment    glasses    Vomiting    Wears glasses      Past Surgical History:   Procedure Laterality Date    Cholecystectomy      Removal gallbladder      Removal gallbladder      2021      Social History:   Social History     Socioeconomic History    Marital status: Single   Tobacco Use    Smoking status:  Never     Passive exposure: Never    Smokeless tobacco: Never    Tobacco comments:     Used to vape for about 2 years, have since quit   Vaping Use    Vaping status: Never Used   Substance and Sexual Activity    Alcohol use: No    Drug use: Yes     Types: Cannabis     Comment: last use 2/8/2024- agrees to abstain   Other Topics Concern    Caffeine Concern No    Exercise No    Seat Belt No    Special Diet No    Stress Concern No    Weight Concern No     Social Drivers of Health      Received from [employer], [employer]    [facility]             ROS:  GEN: no fever, no chills, no fatigue  CHEST: no chest pains.  SKIN: no rashes  HEM: no ecchymoses  JOINTS: no other joints pain.  NEURO: no tingling, no weakness, no abnormal sensation.      /80   Pulse 107   Resp 16   Ht 5' 10\" (1.778 m)   Wt 191 lb (86.6 kg)   SpO2 98%   BMI 27.41 kg/m²     PE:  Gen:  WD/WN NAD  HEENT:  PEERLA, EOM-i.  LUNGS:CTA tish.  HEART:S1/S2 reg., no murmurs, clicks, gallops  SKIN:no rashes on the chest or back.  Lower back: Normal on inspection, moderate pain on palpation of tail bone.  Neuro:  Reflexes at knees 2+ and symmetric      A/P  Diagnoses and all orders for this visit:    Lumbar radiculopathy  -     XR LUMBAR SPINE (MIN 4 VIEWS) (CPT=72110); Future  -     XR SACRUM + COCCYX (MIN 2 VIEWS) (CPT=72220); Future  -     cyclobenzaprine 10 MG Oral Tab; Take 0.5 tablets (5 mg total) by mouth nightly.  -     methylPREDNISolone (MEDROL) 4 MG Oral Tablet Therapy Pack; As directed.    Coccydynia  -     XR SACRUM + COCCYX (MIN 2 VIEWS) (CPT=72220); Future    Blood in the stool  -     H. Pylori Stool Ag, EIA [E]; Future  -     Occult Blood, Fecal, Immunoassay (Blue cards) [E]; Future  -     CBC With Differential With Platelet; Future  -     Comp Metabolic Panel (14) [E]; Future  -     Gastro Referral - In Network  -     pantoprazole 20 MG Oral Tab EC; Take 1 tablet (20 mg total) by mouth every morning before breakfast.  Rest/ Ice or  Heat   F/u for worsening symptoms

## 2025-02-06 ENCOUNTER — HOSPITAL ENCOUNTER (OUTPATIENT)
Dept: GENERAL RADIOLOGY | Age: 20
Discharge: HOME | End: 2025-02-06
Attending: NURSE PRACTITIONER
Payer: COMMERCIAL

## 2025-02-06 DIAGNOSIS — M53.3 COCCYDYNIA: ICD-10-CM

## 2025-02-06 DIAGNOSIS — M54.16 LUMBAR RADICULOPATHY: ICD-10-CM

## 2025-02-06 PROCEDURE — 72110 X-RAY EXAM L-2 SPINE 4/>VWS: CPT | Performed by: NURSE PRACTITIONER

## 2025-02-06 PROCEDURE — 72220 X-RAY EXAM SACRUM TAILBONE: CPT | Performed by: NURSE PRACTITIONER

## 2025-02-07 ENCOUNTER — LAB ENCOUNTER (OUTPATIENT)
Dept: LAB | Age: 20
End: 2025-02-07
Attending: NURSE PRACTITIONER
Payer: COMMERCIAL

## 2025-02-07 ENCOUNTER — TELEPHONE (OUTPATIENT)
Dept: FAMILY MEDICINE CLINIC | Facility: CLINIC | Age: 20
End: 2025-02-07

## 2025-02-07 DIAGNOSIS — K92.1 BLOOD IN THE STOOL: ICD-10-CM

## 2025-02-07 PROCEDURE — 87338 HPYLORI STOOL AG IA: CPT

## 2025-02-08 LAB — H PYLORI AG STL QL IA: NEGATIVE

## 2025-02-12 ENCOUNTER — LAB ENCOUNTER (OUTPATIENT)
Dept: LAB | Age: 20
End: 2025-02-12
Attending: NURSE PRACTITIONER
Payer: COMMERCIAL

## 2025-06-16 ENCOUNTER — OFFICE VISIT (OUTPATIENT)
Dept: FAMILY MEDICINE CLINIC | Facility: CLINIC | Age: 20
End: 2025-06-16
Payer: COMMERCIAL

## 2025-06-16 ENCOUNTER — LAB ENCOUNTER (OUTPATIENT)
Dept: LAB | Age: 20
End: 2025-06-16
Attending: FAMILY MEDICINE
Payer: COMMERCIAL

## 2025-06-16 VITALS
HEART RATE: 80 BPM | SYSTOLIC BLOOD PRESSURE: 118 MMHG | HEIGHT: 70 IN | OXYGEN SATURATION: 99 % | BODY MASS INDEX: 28.2 KG/M2 | DIASTOLIC BLOOD PRESSURE: 74 MMHG | WEIGHT: 197 LBS

## 2025-06-16 DIAGNOSIS — Z59.41 FOOD INSECURITY: ICD-10-CM

## 2025-06-16 DIAGNOSIS — Z59.10 INADEQUATE HOUSING, UNSPECIFIED: ICD-10-CM

## 2025-06-16 DIAGNOSIS — Z00.00 LABORATORY EXAMINATION ORDERED AS PART OF A ROUTINE GENERAL MEDICAL EXAMINATION: ICD-10-CM

## 2025-06-16 DIAGNOSIS — R19.5 CHANGE IN STOOL CALIBER: ICD-10-CM

## 2025-06-16 DIAGNOSIS — R11.2 NAUSEA AND VOMITING, UNSPECIFIED VOMITING TYPE: ICD-10-CM

## 2025-06-16 DIAGNOSIS — R68.81 EARLY SATIETY: ICD-10-CM

## 2025-06-16 DIAGNOSIS — R19.5 CHANGE IN STOOL CALIBER: Primary | ICD-10-CM

## 2025-06-16 DIAGNOSIS — Z83.79 FAMILY HISTORY OF ULCERATIVE COLITIS: ICD-10-CM

## 2025-06-16 LAB
ALBUMIN SERPL-MCNC: 5.2 G/DL (ref 3.2–4.8)
ALP LIVER SERPL-CCNC: 77 U/L (ref 45–117)
ALT SERPL-CCNC: 22 U/L (ref 10–49)
ANION GAP SERPL CALC-SCNC: 9 MMOL/L (ref 0–18)
AST SERPL-CCNC: 21 U/L (ref ?–34)
BASOPHILS # BLD AUTO: 0.06 X10(3) UL (ref 0–0.2)
BASOPHILS NFR BLD AUTO: 0.7 %
BILIRUB DIRECT SERPL-MCNC: 0.2 MG/DL (ref ?–0.3)
BILIRUB SERPL-MCNC: 0.6 MG/DL (ref 0.3–1.2)
BUN BLD-MCNC: 17 MG/DL (ref 9–23)
CALCIUM BLD-MCNC: 10.1 MG/DL (ref 8.7–10.6)
CHLORIDE SERPL-SCNC: 107 MMOL/L (ref 98–112)
CHOLEST SERPL-MCNC: 154 MG/DL (ref ?–200)
CO2 SERPL-SCNC: 26 MMOL/L (ref 21–32)
CREAT BLD-MCNC: 1.03 MG/DL (ref 0.7–1.3)
EGFRCR SERPLBLD CKD-EPI 2021: 106 ML/MIN/1.73M2 (ref 60–?)
EOSINOPHIL # BLD AUTO: 0.39 X10(3) UL (ref 0–0.7)
EOSINOPHIL NFR BLD AUTO: 4.4 %
ERYTHROCYTE [DISTWIDTH] IN BLOOD BY AUTOMATED COUNT: 12.5 %
FASTING PATIENT LIPID ANSWER: YES
FASTING STATUS PATIENT QL REPORTED: YES
GLUCOSE BLD-MCNC: 89 MG/DL (ref 70–99)
HCT VFR BLD AUTO: 46.1 % (ref 39–53)
HDLC SERPL-MCNC: 55 MG/DL (ref 40–59)
HGB BLD-MCNC: 15.5 G/DL (ref 13–17.5)
IMM GRANULOCYTES # BLD AUTO: 0.1 X10(3) UL (ref 0–1)
IMM GRANULOCYTES NFR BLD: 1.1 %
LDLC SERPL CALC-MCNC: 88 MG/DL (ref ?–100)
LIPASE SERPL-CCNC: 27 U/L (ref 12–53)
LYMPHOCYTES # BLD AUTO: 2.17 X10(3) UL (ref 1–4)
LYMPHOCYTES NFR BLD AUTO: 24.4 %
MCH RBC QN AUTO: 31.3 PG (ref 26–34)
MCHC RBC AUTO-ENTMCNC: 33.6 G/DL (ref 31–37)
MCV RBC AUTO: 92.9 FL (ref 80–100)
MONOCYTES # BLD AUTO: 0.8 X10(3) UL (ref 0.1–1)
MONOCYTES NFR BLD AUTO: 9 %
NEUTROPHILS # BLD AUTO: 5.38 X10 (3) UL (ref 1.5–7.7)
NEUTROPHILS # BLD AUTO: 5.38 X10(3) UL (ref 1.5–7.7)
NEUTROPHILS NFR BLD AUTO: 60.4 %
NONHDLC SERPL-MCNC: 99 MG/DL (ref ?–130)
OSMOLALITY SERPL CALC.SUM OF ELEC: 295 MOSM/KG (ref 275–295)
PLATELET # BLD AUTO: 307 10(3)UL (ref 150–450)
POTASSIUM SERPL-SCNC: 4.5 MMOL/L (ref 3.5–5.1)
PROT SERPL-MCNC: 7.7 G/DL (ref 5.7–8.2)
RBC # BLD AUTO: 4.96 X10(6)UL (ref 4.3–5.7)
SODIUM SERPL-SCNC: 142 MMOL/L (ref 136–145)
T4 FREE SERPL-MCNC: 1.4 NG/DL (ref 0.8–1.7)
THYROGLOB SERPL-MCNC: 22 U/ML (ref ?–60)
THYROPEROXIDASE AB SERPL-ACNC: 40 U/ML (ref ?–60)
TRIGL SERPL-MCNC: 55 MG/DL (ref 30–149)
TSI SER-ACNC: 1.34 UIU/ML (ref 0.55–4.78)
VLDLC SERPL CALC-MCNC: 9 MG/DL (ref 0–30)
WBC # BLD AUTO: 8.9 X10(3) UL (ref 4–11)

## 2025-06-16 PROCEDURE — 3074F SYST BP LT 130 MM HG: CPT | Performed by: FAMILY MEDICINE

## 2025-06-16 PROCEDURE — 3008F BODY MASS INDEX DOCD: CPT | Performed by: FAMILY MEDICINE

## 2025-06-16 PROCEDURE — 85025 COMPLETE CBC W/AUTO DIFF WBC: CPT | Performed by: FAMILY MEDICINE

## 2025-06-16 PROCEDURE — 80076 HEPATIC FUNCTION PANEL: CPT | Performed by: FAMILY MEDICINE

## 2025-06-16 PROCEDURE — 83690 ASSAY OF LIPASE: CPT | Performed by: FAMILY MEDICINE

## 2025-06-16 PROCEDURE — 80061 LIPID PANEL: CPT | Performed by: FAMILY MEDICINE

## 2025-06-16 PROCEDURE — 86800 THYROGLOBULIN ANTIBODY: CPT | Performed by: FAMILY MEDICINE

## 2025-06-16 PROCEDURE — 3078F DIAST BP <80 MM HG: CPT | Performed by: FAMILY MEDICINE

## 2025-06-16 PROCEDURE — 84443 ASSAY THYROID STIM HORMONE: CPT | Performed by: FAMILY MEDICINE

## 2025-06-16 PROCEDURE — 80048 BASIC METABOLIC PNL TOTAL CA: CPT | Performed by: FAMILY MEDICINE

## 2025-06-16 PROCEDURE — 99213 OFFICE O/P EST LOW 20 MIN: CPT | Performed by: FAMILY MEDICINE

## 2025-06-16 PROCEDURE — 86376 MICROSOMAL ANTIBODY EACH: CPT | Performed by: FAMILY MEDICINE

## 2025-06-16 PROCEDURE — 84439 ASSAY OF FREE THYROXINE: CPT | Performed by: FAMILY MEDICINE

## 2025-06-16 SDOH — ECONOMIC STABILITY - FOOD INSECURITY: FOOD INSECURITY: Z59.41

## 2025-06-16 SDOH — ECONOMIC STABILITY - HOUSING INSECURITY: INADEQUATE HOUSING UNSPECIFIED: Z59.10

## (undated) DEVICE — 40580 - THE PINK PAD - ADVANCED TRENDELENBURG POSITIONING KIT: Brand: 40580 - THE PINK PAD - ADVANCED TRENDELENBURG POSITIONING KIT

## (undated) DEVICE — SOL  .9 1000ML BTL

## (undated) DEVICE — TROCAR: Brand: KII SHIELDED BLADED ACCESS SYSTEM

## (undated) DEVICE — LAP CHOLE/APPY CDS-LF: Brand: MEDLINE INDUSTRIES, INC.

## (undated) DEVICE — TIGERTAIL 5F FLXTIP 70CM

## (undated) DEVICE — TROCAR: Brand: KII® SLEEVE

## (undated) DEVICE — STERILE SYNTHETIC POLYISOPRENE POWDER-FREE SURGICAL GLOVES WITH HYDROGEL COATING: Brand: PROTEXIS

## (undated) DEVICE — CHLORAPREP 26ML APPLICATOR

## (undated) DEVICE — MONOFILAMENT ABSORBABLE SUTURE: Brand: MAXON

## (undated) DEVICE — INSUFFLATION NEEDLE TO ESTABLISH PNEUMOPERITONEUM.: Brand: INSUFFLATION NEEDLE

## (undated) DEVICE — SUTURE VICRYL 5-0 PC-1

## (undated) DEVICE — SCD SLEEVE KNEE HI BLEND

## (undated) DEVICE — ZIPWIRE GUIDE .038X150 STR/STF

## (undated) DEVICE — C-ARM: Brand: UNBRANDED

## (undated) DEVICE — Device

## (undated) DEVICE — LIGHT HANDLE

## (undated) DEVICE — SUTURE MONOCRYL 4-0 PS-2

## (undated) DEVICE — DISPOSABLE LAPAROSCOPIC CLIP APPLIER WITH 20 CLIPS.: Brand: EPIX® UNIVERSAL CLIP APPLIER

## (undated) NOTE — LETTER
Date: 8/30/2024    Patient Name: Slava Gates          To Whom it may concern:    The above patient was seen at [facility] for treatment of a medical condition.    This patient should be excused from attending work today and tomorrow 8/30/24 and 8/31/24 due to illness.  He has been exposed to Covid.  Test results should be back tomorrow.  He is under our care.        Sincerely,      Lorraine Pedersen PA-C

## (undated) NOTE — LETTER
Date: 8/17/2024    Patient Name: Slava Gates          To Whom it may concern:    This letter has been written at the patient's request. The above patient was seen at [facility] for treatment of a medical condition.    This patient should be excused from attending work/school on 08/17/24.         Sincerely,    Shilpa Flores PA-C

## (undated) NOTE — Clinical Note
I had the pleasure of seeing Mark Mackey on 5/3/2021. Please see my attached note.     Eryn Melgar MD FACS  [facility]

## (undated) NOTE — LETTER
Date & Time: 11/11/2023, 9:39 AM  Patient: Bob Blair  Encounter Provider(s):    EMMANUEL Davidson       To Whom It May Concern:    Yusra Tello was seen and treated in our department on 11/11/2023. He should not return to work until 11/12/2023 . If you have any questions or concerns, please do not hesitate to call.         Danielito House NP-C  Nurse Practitioner    This note has been electronically signed

## (undated) NOTE — LETTER
Name:  [employer] Year:  9th Grade Class: Student ID No.:   Address:  [facility] [place] [address] Phone:  [phone] (home)  : [age]year old   Name Relationship [address] Work Phone Home Phone Mobile Phone   1.  CIERRA OTERO 13. Does anyone in your family have a heart problem, pacemaker, or implanted defibrillator? No   16. Has anyone in your family had unexplained fainting, seizures, or near drowning?  No   BONE AND JOINT QUESTIONS    17. Have you ever had an injury to a bone, 38. Have you ever had numbness, tingling, or weakness in your arms or legs after being hit or falling? No   39. Have you ever been unable to move your arms / legs after being hit /fall? No   40. Have you ever become ill while exercising in the heat?  No   41 excavatum,      arachnodactyly, arm span > height, hyperlaxity, myopia, MVP, aortic insufficiency) Yes    Eyes/Ears/Nose/Throat:    · Pupils equal  · Hearing Yes    Lymph nodes Yes    Heart*  · Murmurs (auscultation standing, supine, +/- Valsalva)  · Locat defined in the [facility] Performance-Enhancing Substance Testing Program Protocol.  We have reviewed the policy and understand that I/our student may be asked to submit to testing for the presence of performance-enhancing substances in my/his/her body either dur

## (undated) NOTE — LETTER
Date: 9/5/2022    Patient Name: Moriah Patch          To Whom it may concern: This letter has been written at the patient's request. The above patient was seen at the [facility] for treatment of a medical condition. The patient may return to work/school on 09/06/22 with the following limitations none.         Sincerely,      Red Ortiz NP

## (undated) NOTE — LETTER
Date & Time: 8/29/2023, 6:13 PM  Patient: Adrienne Mazariegos  Encounter Provider(s): Princess Butler MD       To Whom It May Concern:    Bibiana Foley was seen and treated in our department on 8/29/2023. He should not return to work until 8/31/23 .     If you have any questions or concerns, please do not hesitate to call.        _____________________________  Physician/APC Signature

## (undated) NOTE — LETTER
State of [facility] [employer] of Child Health Examination       Student's Name  [facility] Ramy Title                           Date    (If adding dates to the above immunization history section, put your initials by date(s) and sign here.)   ALTERNATIVE PROOF OF IMMUNITY   1 prescribed or taken on a regular basis.)  No current outpatient prescriptions on file. Diagnosis of asthma?   Child wakes during the night coughing   Yes   No    Yes   No    Loss of function of one of paired organs? (eye/ear/kidney/testicle)   Yes   No DIABETES SCREENING  BMI>85% age/sex  No And any two of the following:  Family History No    Ethnic Minority  No          Signs of Insulin Resistance (hypertension, dyslipidemia, polycystic ovarian syndrome, acanthosis nigricans)    No           At Risk  No Quick-relief  medication (e.g. Short Acting Beta Antagonist): No          Controller medication (e.g. inhaled corticosteroid):   No Other   NEEDS/MODIFICATIONS required in the school setting  None DIETARY Needs/Restrictions     None   SPECIAL INSTR